# Patient Record
Sex: MALE | Race: WHITE | Employment: UNEMPLOYED | URBAN - METROPOLITAN AREA
[De-identification: names, ages, dates, MRNs, and addresses within clinical notes are randomized per-mention and may not be internally consistent; named-entity substitution may affect disease eponyms.]

---

## 2017-06-12 ENCOUNTER — TELEPHONE (OUTPATIENT)
Dept: FAMILY MEDICINE CLINIC | Age: 22
End: 2017-06-12

## 2017-06-13 ENCOUNTER — OFFICE VISIT (OUTPATIENT)
Dept: FAMILY MEDICINE CLINIC | Age: 22
End: 2017-06-13

## 2017-06-13 VITALS
BODY MASS INDEX: 32.91 KG/M2 | WEIGHT: 243 LBS | OXYGEN SATURATION: 95 % | TEMPERATURE: 98 F | SYSTOLIC BLOOD PRESSURE: 114 MMHG | HEIGHT: 72 IN | HEART RATE: 107 BPM | DIASTOLIC BLOOD PRESSURE: 74 MMHG

## 2017-06-13 DIAGNOSIS — J32.9 SINUSITIS, UNSPECIFIED CHRONICITY, UNSPECIFIED LOCATION: Primary | ICD-10-CM

## 2017-06-13 DIAGNOSIS — J06.9 ACUTE URI: ICD-10-CM

## 2017-06-13 PROCEDURE — 99203 OFFICE O/P NEW LOW 30 MIN: CPT | Performed by: FAMILY MEDICINE

## 2017-06-13 RX ORDER — FLUTICASONE PROPIONATE 50 MCG
SPRAY, SUSPENSION (ML) NASAL
Qty: 1 BOTTLE | Refills: 11 | Status: SHIPPED | OUTPATIENT
Start: 2017-06-13

## 2017-06-13 RX ORDER — AMOXICILLIN AND CLAVULANATE POTASSIUM 875; 125 MG/1; MG/1
1 TABLET, FILM COATED ORAL 2 TIMES DAILY
Qty: 20 TABLET | Refills: 0 | Status: SHIPPED | OUTPATIENT
Start: 2017-06-13 | End: 2017-06-23

## 2017-06-13 ASSESSMENT — PATIENT HEALTH QUESTIONNAIRE - PHQ9
SUM OF ALL RESPONSES TO PHQ9 QUESTIONS 1 & 2: 0
SUM OF ALL RESPONSES TO PHQ QUESTIONS 1-9: 0
1. LITTLE INTEREST OR PLEASURE IN DOING THINGS: 0
2. FEELING DOWN, DEPRESSED OR HOPELESS: 0

## 2017-06-15 ASSESSMENT — ENCOUNTER SYMPTOMS
SHORTNESS OF BREATH: 0
HOARSE VOICE: 0
SINUS PRESSURE: 1
SWOLLEN GLANDS: 1
COUGH: 1
SORE THROAT: 1

## 2017-07-10 ENCOUNTER — OFFICE VISIT (OUTPATIENT)
Dept: FAMILY MEDICINE CLINIC | Age: 22
End: 2017-07-10

## 2017-07-10 VITALS
DIASTOLIC BLOOD PRESSURE: 78 MMHG | OXYGEN SATURATION: 96 % | BODY MASS INDEX: 33.63 KG/M2 | WEIGHT: 248 LBS | HEART RATE: 96 BPM | SYSTOLIC BLOOD PRESSURE: 112 MMHG

## 2017-07-10 DIAGNOSIS — Z00.00 WELL ADULT EXAM: Primary | ICD-10-CM

## 2017-07-10 LAB — TSH REFLEX: 1.34 UIU/ML (ref 0.27–4.2)

## 2017-07-10 PROCEDURE — 36415 COLL VENOUS BLD VENIPUNCTURE: CPT | Performed by: FAMILY MEDICINE

## 2017-07-10 PROCEDURE — 99395 PREV VISIT EST AGE 18-39: CPT | Performed by: FAMILY MEDICINE

## 2017-07-10 ASSESSMENT — ENCOUNTER SYMPTOMS
CHEST TIGHTNESS: 0
ANAL BLEEDING: 0
EYE DISCHARGE: 0
CONSTIPATION: 0
ABDOMINAL PAIN: 0
DIARRHEA: 0
ABDOMINAL DISTENTION: 0
COUGH: 0
BLOOD IN STOOL: 0
SHORTNESS OF BREATH: 0

## 2019-10-18 ENCOUNTER — NURSE ONLY (OUTPATIENT)
Dept: FAMILY MEDICINE CLINIC | Age: 24
End: 2019-10-18
Payer: COMMERCIAL

## 2019-10-18 DIAGNOSIS — Z23 NEED FOR INFLUENZA VACCINATION: Primary | ICD-10-CM

## 2019-10-18 PROCEDURE — 90686 IIV4 VACC NO PRSV 0.5 ML IM: CPT | Performed by: NURSE PRACTITIONER

## 2019-10-18 PROCEDURE — 90471 IMMUNIZATION ADMIN: CPT | Performed by: NURSE PRACTITIONER

## 2021-01-10 ENCOUNTER — APPOINTMENT (OUTPATIENT)
Dept: CT IMAGING | Age: 26
End: 2021-01-10
Payer: COMMERCIAL

## 2021-01-10 ENCOUNTER — HOSPITAL ENCOUNTER (EMERGENCY)
Age: 26
Discharge: HOME | End: 2021-01-10
Payer: COMMERCIAL

## 2021-01-10 ENCOUNTER — NURSE TRIAGE (OUTPATIENT)
Dept: OTHER | Facility: CLINIC | Age: 26
End: 2021-01-10

## 2021-01-10 ENCOUNTER — APPOINTMENT (OUTPATIENT)
Dept: GENERAL RADIOLOGY | Age: 26
End: 2021-01-10
Payer: COMMERCIAL

## 2021-01-10 VITALS
HEART RATE: 86 BPM | OXYGEN SATURATION: 98 % | WEIGHT: 315 LBS | BODY MASS INDEX: 42.66 KG/M2 | SYSTOLIC BLOOD PRESSURE: 113 MMHG | DIASTOLIC BLOOD PRESSURE: 78 MMHG | RESPIRATION RATE: 19 BRPM | TEMPERATURE: 98.1 F | HEIGHT: 72 IN

## 2021-01-10 DIAGNOSIS — U07.1 COVID-19: Primary | ICD-10-CM

## 2021-01-10 DIAGNOSIS — R91.8 PULMONARY NODULES: ICD-10-CM

## 2021-01-10 LAB
A/G RATIO: 1.3 (ref 1.1–2.2)
ALBUMIN SERPL-MCNC: 4.3 G/DL (ref 3.4–5)
ALP BLD-CCNC: 128 U/L (ref 40–129)
ALT SERPL-CCNC: 50 U/L (ref 10–40)
ANION GAP SERPL CALCULATED.3IONS-SCNC: 12 MMOL/L (ref 3–16)
AST SERPL-CCNC: 37 U/L (ref 15–37)
BASOPHILS ABSOLUTE: 0.1 K/UL (ref 0–0.2)
BASOPHILS RELATIVE PERCENT: 0.9 %
BILIRUB SERPL-MCNC: 0.5 MG/DL (ref 0–1)
BUN BLDV-MCNC: 9 MG/DL (ref 7–20)
CALCIUM SERPL-MCNC: 9.1 MG/DL (ref 8.3–10.6)
CHLORIDE BLD-SCNC: 100 MMOL/L (ref 99–110)
CO2: 24 MMOL/L (ref 21–32)
CREAT SERPL-MCNC: 0.9 MG/DL (ref 0.9–1.3)
D DIMER: 362 NG/ML DDU (ref 0–229)
EKG ATRIAL RATE: 107 BPM
EKG DIAGNOSIS: NORMAL
EKG P AXIS: 18 DEGREES
EKG P-R INTERVAL: 122 MS
EKG Q-T INTERVAL: 332 MS
EKG QRS DURATION: 82 MS
EKG QTC CALCULATION (BAZETT): 443 MS
EKG R AXIS: 11 DEGREES
EKG T AXIS: 33 DEGREES
EKG VENTRICULAR RATE: 107 BPM
EOSINOPHILS ABSOLUTE: 0.2 K/UL (ref 0–0.6)
EOSINOPHILS RELATIVE PERCENT: 2.5 %
GFR AFRICAN AMERICAN: >60
GFR NON-AFRICAN AMERICAN: >60
GLOBULIN: 3.3 G/DL
GLUCOSE BLD-MCNC: 109 MG/DL (ref 70–99)
HCT VFR BLD CALC: 42 % (ref 40.5–52.5)
HEMOGLOBIN: 14.6 G/DL (ref 13.5–17.5)
LACTIC ACID: 1.4 MMOL/L (ref 0.4–2)
LYMPHOCYTES ABSOLUTE: 1.4 K/UL (ref 1–5.1)
LYMPHOCYTES RELATIVE PERCENT: 22.1 %
MAGNESIUM: 2 MG/DL (ref 1.8–2.4)
MCH RBC QN AUTO: 30.9 PG (ref 26–34)
MCHC RBC AUTO-ENTMCNC: 34.7 G/DL (ref 31–36)
MCV RBC AUTO: 89.1 FL (ref 80–100)
MONOCYTES ABSOLUTE: 0.7 K/UL (ref 0–1.3)
MONOCYTES RELATIVE PERCENT: 11.1 %
NEUTROPHILS ABSOLUTE: 4.1 K/UL (ref 1.7–7.7)
NEUTROPHILS RELATIVE PERCENT: 63.4 %
PDW BLD-RTO: 13.1 % (ref 12.4–15.4)
PLATELET # BLD: 265 K/UL (ref 135–450)
PMV BLD AUTO: 8.3 FL (ref 5–10.5)
POTASSIUM REFLEX MAGNESIUM: 3.5 MMOL/L (ref 3.5–5.1)
RBC # BLD: 4.71 M/UL (ref 4.2–5.9)
SODIUM BLD-SCNC: 136 MMOL/L (ref 136–145)
TOTAL PROTEIN: 7.6 G/DL (ref 6.4–8.2)
TROPONIN: <0.01 NG/ML
WBC # BLD: 6.5 K/UL (ref 4–11)

## 2021-01-10 PROCEDURE — 83605 ASSAY OF LACTIC ACID: CPT

## 2021-01-10 PROCEDURE — 99283 EMERGENCY DEPT VISIT LOW MDM: CPT

## 2021-01-10 PROCEDURE — 71260 CT THORAX DX C+: CPT

## 2021-01-10 PROCEDURE — 83735 ASSAY OF MAGNESIUM: CPT

## 2021-01-10 PROCEDURE — 84484 ASSAY OF TROPONIN QUANT: CPT

## 2021-01-10 PROCEDURE — 6360000004 HC RX CONTRAST MEDICATION: Performed by: PHYSICIAN ASSISTANT

## 2021-01-10 PROCEDURE — 93005 ELECTROCARDIOGRAM TRACING: CPT | Performed by: PHYSICIAN ASSISTANT

## 2021-01-10 PROCEDURE — 85025 COMPLETE CBC W/AUTO DIFF WBC: CPT

## 2021-01-10 PROCEDURE — 85379 FIBRIN DEGRADATION QUANT: CPT

## 2021-01-10 PROCEDURE — 71045 X-RAY EXAM CHEST 1 VIEW: CPT

## 2021-01-10 PROCEDURE — 80053 COMPREHEN METABOLIC PANEL: CPT

## 2021-01-10 PROCEDURE — 93010 ELECTROCARDIOGRAM REPORT: CPT | Performed by: INTERNAL MEDICINE

## 2021-01-10 PROCEDURE — 2580000003 HC RX 258: Performed by: PHYSICIAN ASSISTANT

## 2021-01-10 RX ORDER — 0.9 % SODIUM CHLORIDE 0.9 %
1000 INTRAVENOUS SOLUTION INTRAVENOUS ONCE
Status: COMPLETED | OUTPATIENT
Start: 2021-01-10 | End: 2021-01-10

## 2021-01-10 RX ORDER — ASPIRIN 81 MG/1
81 TABLET ORAL DAILY
Qty: 30 TABLET | Refills: 0 | Status: SHIPPED | OUTPATIENT
Start: 2021-01-10

## 2021-01-10 RX ADMIN — IOPAMIDOL 85 ML: 755 INJECTION, SOLUTION INTRAVENOUS at 12:55

## 2021-01-10 RX ADMIN — SODIUM CHLORIDE 1000 ML: 9 INJECTION, SOLUTION INTRAVENOUS at 12:17

## 2021-01-10 ASSESSMENT — ENCOUNTER SYMPTOMS
COUGH: 1
VOMITING: 0
EYE PAIN: 0
SORE THROAT: 1
ABDOMINAL PAIN: 0
SHORTNESS OF BREATH: 1
BACK PAIN: 0
NAUSEA: 0

## 2021-01-10 NOTE — TELEPHONE ENCOUNTER
Reason for Disposition   MILD difficulty breathing (e.g., minimal/no SOB at rest, SOB with walking, pulse <100)    Answer Assessment - Initial Assessment Questions  1. COVID-19 DIAGNOSIS: \"Who made your Coronavirus (COVID-19) diagnosis? \" \"Was it confirmed by a positive lab test?\" If not diagnosed by a HCP, ask \"Are there lots of cases (community spread) where you live? \" (See public health department website, if unsure)      [facility]  2. COVID-19 EXPOSURE: \"Was there any known exposure to COVID before the symptoms began? \" CDC Definition of close contact: within 6 feet (2 meters) for a total of 15 minutes or more over a 24-hour period. unsure  3. ONSET: \"When did the COVID-19 symptoms start?\"       1/2/2020  4. WORST SYMPTOM: \"What is your worst symptom? \" (e.g., cough, fever, shortness of breath, muscle aches)      SOB- like I can not get a deep breath  5. COUGH: \"Do you have a cough? \" If so, ask: \"How bad is the cough? \"        Yes - moderate  6. FEVER: \"Do you have a fever? \" If so, ask: \"What is your temperature, how was it measured, and when did it start? \"      no  7. RESPIRATORY STATUS: \"Describe your breathing? \" (e.g., shortness of breath, wheezing, unable to speak)       SOB - Wheezing and feel like can't take a deep breath. 8. BETTER-SAME-WORSE: \"Are you getting better, staying the same or getting worse compared to yesterday? \"  If getting worse, ask, \"In what way? \"      Worse- SOB  9. HIGH RISK DISEASE: \"Do you have any chronic medical problems? \" (e.g., asthma, heart or lung disease, weak immune system, obesity, etc.)      Asthma  10. PREGNANCY: \"Is there any chance you are pregnant? \" \"When was your last menstrual period? \"        n/a  11. OTHER SYMPTOMS: \"Do you have any other symptoms? \"  (e.g., chills, fatigue, headache, loss of smell or taste, muscle pain, sore throat; new loss of smell or taste especially support the diagnosis of COVID-19)        Nasal congestion, body aches, dry

## 2021-01-10 NOTE — ED TRIAGE NOTES
Chief Complaint   Patient presents with    Shortness of Breath     c/o sob x 2 days, diagnosed with covid on Wednesday, sore throat

## 2021-01-11 ENCOUNTER — CARE COORDINATION (OUTPATIENT)
Dept: CARE COORDINATION | Age: 26
End: 2021-01-11

## 2021-01-11 NOTE — CARE COORDINATION
First attempt to reach the pt by the RN, JENNIFER. The RN, Ambulatory Care Manager called and left a message with the nurse's call back number asking the pt to return the nurse's call. As a resource only, due to the recent COVID-19 pandemic, [facility]) has a [employer], [phone] for anyone that is experiencing respiratory problems, fever or Flu-like symptoms. Pt is aware that he is positive for COVID-19.

## 2021-01-11 NOTE — ED PROVIDER NOTES
[address] ED  EMERGENCY DEPARTMENT ENCOUNTER        Pt Name: Shaye Herrera  MRN: [id number]  [place] 1995  Date of evaluation: 1/10/2021  Provider: [facility] [place]  PCP: Bryanna Angel MD    DASHA. I have evaluated this patient. My supervising physician was available for consultation. CHIEF COMPLAINT       Chief Complaint   Patient presents with    Shortness of Breath     c/o sob x 2 days, diagnosed with covid on Wednesday, sore throat       HISTORY OF PRESENT ILLNESS   (Location, Timing/Onset, Context/Setting, Quality, Duration, Modifying Factors, Severity, Associated Signs and Symptoms)  Note limiting factors. Shaye Herrera is a 23 y.o. male with recent diagnosis of COVID-19 presents emergency room for shortness of breath, sore throat. Patient reports that he was diagnosed with COVID-19 on Wednesday. Has had symptoms for approximately 1 week. Notes that over the last 2 days he has had a sore throat, shortness of breath. Feels \"icky\". Denies tongue lip or throat swelling, inability swallow solids or liquids, excessive drooling or spitting, chest pain, hemoptysis, calf swelling or pain, abdominal pain, nausea, vomiting, numbness, weakness. Nursing Notes were all reviewed and agreed with or any disagreements were addressed in the HPI. REVIEW OF SYSTEMS    (2-9 systems for level 4, 10 or more for level 5)     Review of Systems   Constitutional: Negative for fever. HENT: Positive for sore throat. Eyes: Negative for pain and visual disturbance. Respiratory: Positive for cough and shortness of breath. Cardiovascular: Negative for chest pain. Gastrointestinal: Negative for abdominal pain, nausea and vomiting. Genitourinary: Negative for dysuria and frequency. Musculoskeletal: Negative for back pain and neck pain. Skin: Negative for rash. Neurological: Negative for weakness, numbness and headaches.    Psychiatric/Behavioral: Negative for confusion. Positives and Pertinent negatives as per HPI. Except as noted above in the ROS, all other systems were reviewed and negative. PAST MEDICAL HISTORY     Past Medical History:   Diagnosis Date    ADHD (attention deficit hyperactivity disorder)     Asperger's syndrome          SURGICAL HISTORY     Past Surgical History:   Procedure Laterality Date    TESTICLE REMOVAL           CURRENTMEDICATIONS       Discharge Medication List as of 1/10/2021  2:00 PM      CONTINUE these medications which have NOT CHANGED    Details   fluticasone (FLONASE) 50 MCG/ACT nasal spray 2 SPRAYS UP EACH NOSTRIL daily, Disp-1 Bottle, R-11Normal               ALLERGIES     Triamazide [hydrochlorothiazide w-triamterene]    FAMILYHISTORY     History reviewed. No pertinent family history. SOCIAL HISTORY       Social History     Tobacco Use    Smoking status: Never Smoker    Smokeless tobacco: Never Used   Substance Use Topics    Alcohol use: Yes     Frequency: Never     Comment: occ    Drug use: Never       SCREENINGS             PHYSICAL EXAM    (up to 7 for level 4, 8 or more for level 5)     ED Triage Vitals [01/10/21 1157]   BP Temp Temp Source Pulse Resp SpO2 Height Weight   138/79 98.1 °F (36.7 °C) Oral 118 18 97 % 6' (1.829 m) (!) 320 lb (145.2 kg)       Physical Exam  Vitals signs reviewed. Constitutional:       Appearance: He is not diaphoretic. HENT:      Nose: No congestion or rhinorrhea. Mouth/Throat:      Mouth: Mucous membranes are moist.      Pharynx: Oropharynx is clear. No oropharyngeal exudate or posterior oropharyngeal erythema. Eyes:      General: No scleral icterus. Conjunctiva/sclera: Conjunctivae normal.   Neck:      Musculoskeletal: Normal range of motion and neck supple. Cardiovascular:      Rate and Rhythm: Regular rhythm. Tachycardia present. Pulses: Normal pulses. Heart sounds: Normal heart sounds. No murmur. No friction rub. No gallop.        Comments: Triage tachycardia noted. Pulmonary:      Effort: Pulmonary effort is normal. No respiratory distress. Breath sounds: Normal breath sounds. No stridor. No wheezing, rhonchi or rales. Abdominal:      General: There is no distension. Palpations: Abdomen is soft. Tenderness: There is no abdominal tenderness. There is no right CVA tenderness, left CVA tenderness, guarding or rebound. Musculoskeletal: Normal range of motion. General: No swelling or tenderness. Skin:     General: Skin is warm and dry. Capillary Refill: Capillary refill takes less than 2 seconds. Neurological:      General: No focal deficit present. Mental Status: He is alert and oriented to person, place, and time. Sensory: No sensory deficit. Motor: No weakness.       Gait: Gait normal.   Psychiatric:         Mood and Affect: Mood normal.         Behavior: Behavior normal.         DIAGNOSTIC RESULTS   LABS:    Labs Reviewed   COMPREHENSIVE METABOLIC PANEL W/ REFLEX TO MG FOR LOW K - Abnormal; Notable for the following components:       Result Value    Glucose 109 (*)     ALT 50 (*)     All other components within normal limits    Narrative:     Performed at:  [place] [address],  [employer], [facility]   Phone [phone]   D-DIMER, QUANTITATIVE - Abnormal; Notable for the following components:    D-Dimer, Quant 362 (*)     All other components within normal limits    Narrative:     Performed at:  [place] [address],  [place], [facility]   Phone [phone]   CBC WITH AUTO DIFFERENTIAL    Narrative:     Performed at:  [place] [address],  [employer], [facility]   Phone [phone]   TROPONIN    Narrative:     Performed at:  [place] [address],  [place], [facility]   Phone [phone]   LACTIC ACID, PLASMA    Narrative: Performed at:  [facility] [address],  [place], [facility]   Phone [phone]   MAGNESIUM    Narrative:     Performed at:  [facility],  [place], [facility]   Phone [phone]       All other labs were within normal range or not returned as of this dictation. EKG: All EKG's are interpreted by the Emergency Department Physician in the absence of a cardiologist.  Please see their note for interpretation of EKG. RADIOLOGY:   Non-plain film images such as CT, Ultrasound and MRI are read by the radiologist. Plain radiographic images are visualized and preliminarily interpreted by the ED Provider with the below findings:        Interpretation per the Radiologist below, if available at the time of this note:    CT CHEST PULMONARY EMBOLISM W CONTRAST   Final Result   *No CT evidence of acute pulmonary embolism. *Multiple pulmonary nodules are seen within both lungs, largest measuring 1.9   x 1.0 cm within the lingula. Please see follow-up recommendations below. RECOMMENDATIONS:   Fleischner Society guidelines for follow-up and management of incidentally   detected pulmonary nodules:      Multiple Solid Nodules:      Nodule size greater than 8 mm   In a low-risk patient, CT at 3-6 months, then consider CT at 18-24 months. In a high-risk patient, CT at 3-6 months, then CT at 18-24 months. - Low risk patients include individuals with minimal or absent history of   smoking and other known risk factors. - High risk patients include individuals with a history or smoking or known   risk factors. Radiology 2017 http://pubs. rsna.org/doi/full/10.1148/radiol. 8133461594         XR CHEST PORTABLE   Final Result   No acute airspace disease. Enlargement of the cardiac silhouette as compared to the 2013 exam which can   reflect cardiac enlargement or potentially pericardial effusion.            Xr Chest Portable    Result Date: 1/10/2021  EXAMINATION: ONE XRAY VIEW OF THE CHEST 1/10/2021 12:23 pm COMPARISON: 06/15/2013 HISTORY: ORDERING SYSTEM PROVIDED HISTORY: SOB TECHNOLOGIST PROVIDED HISTORY: Reason for exam:->SOB Reason for Exam: shortness of breath Acuity: Acute Type of Exam: Initial FINDINGS: Cardiac leads project over the chest.  Attenuation by soft tissue bilaterally. No confluent airspace disease. No pleural effusion or pneumothorax. Cardiac silhouette is upper limits of normal in size, larger than prior. No acute airspace disease. Enlargement of the cardiac silhouette as compared to the 2013 exam which can reflect cardiac enlargement or potentially pericardial effusion. Ct Chest Pulmonary Embolism W Contrast    Result Date: 1/10/2021  EXAMINATION: CTA OF THE CHEST 1/10/2021 12:44 pm TECHNIQUE: CTA of the chest was performed after the administration of intravenous contrast.  Multiplanar reformatted images are provided for review. MIP images are provided for review. Dose modulation, iterative reconstruction, and/or weight based adjustment of the mA/kV was utilized to reduce the radiation dose to as low as reasonably achievable. COMPARISON: Chest performed earlier today. HISTORY: ORDERING SYSTEM PROVIDED HISTORY: r/o Pulmonary Embolism TECHNOLOGIST PROVIDED HISTORY: Reason for exam:->r/o Pulmonary Embolism Reason for Exam: SOB, CHEST PAIN, COUGHING, COVID + Acuity: Acute Type of Exam: Initial FINDINGS: Pulmonary Arteries: Pulmonary arteries are adequately opacified for evaluation. No evidence of intraluminal filling defect to suggest pulmonary embolism. Main pulmonary artery is normal in caliber. Mediastinum: No evidence of mediastinal lymphadenopathy. The heart and pericardium demonstrate no acute abnormality. There is no acute abnormality of the thoracic aorta. Lungs/pleura: No focal consolidation, pneumothorax or pleural effusion.  Noncalcified pulmonary nodule is seen involving the right upper lobe series 3, image 48 measuring 1 cm in greatest axial dimension. 3 mm solid noncalcified pulmonary nodule right lower lobe series 3, image 73. Additional 3 mm solid calcified pulmonary nodule seen involving the right upper lobe series 3, image 35. Additional nodular areas seen involving the lingula on series 3, image 58 measuring 1.9 x 1.0 cm. Upper Abdomen: No acute findings. Soft Tissues/Bones: Visualized soft tissues surrounding the chest wall demonstrate no acute findings. *No CT evidence of acute pulmonary embolism. *Multiple pulmonary nodules are seen within both lungs, largest measuring 1.9 x 1.0 cm within the lingula. Please see follow-up recommendations below. RECOMMENDATIONS: Fleischner Society guidelines for follow-up and management of incidentally detected pulmonary nodules: Multiple Solid Nodules: Nodule size greater than 8 mm In a low-risk patient, CT at 3-6 months, then consider CT at 18-24 months. In a high-risk patient, CT at 3-6 months, then CT at 18-24 months. - Low risk patients include individuals with minimal or absent history of smoking and other known risk factors. - High risk patients include individuals with a history or smoking or known risk factors. Radiology 2017 http://pubs. rsna.org/doi/full/10.1148/radiol. 3085034891           PROCEDURES   Unless otherwise noted below, none     Procedures    CRITICAL CARE TIME   N/A    CONSULTS:  None      EMERGENCY DEPARTMENT COURSE and DIFFERENTIAL DIAGNOSIS/MDM:   Vitals:    Vitals:    01/10/21 1307 01/10/21 1337 01/10/21 1354 01/10/21 1405   BP: 113/75 118/71  113/78   Pulse: 94 85 88 86   Resp: 20 11 19   Temp:       TempSrc:       SpO2: 99% 100%  98%   Weight:       Height:           Patient was given the following medications:  Medications   0.9 % sodium chloride bolus (0 mLs Intravenous Stopped 1/10/21 1401)   iopamidol (ISOVUE-370) 76 % injection 85 mL (85 mLs Intravenous Given 1/10/21 1255)           72-year-old male with PA  01/10/21 1928

## 2021-01-12 ENCOUNTER — TELEPHONE (OUTPATIENT)
Dept: CASE MANAGEMENT | Age: 26
End: 2021-01-12

## 2021-01-12 ENCOUNTER — CARE COORDINATION (OUTPATIENT)
Dept: CARE COORDINATION | Age: 26
End: 2021-01-12

## 2021-01-12 ENCOUNTER — TELEPHONE (OUTPATIENT)
Dept: FAMILY MEDICINE CLINIC | Age: 26
End: 2021-01-12

## 2021-01-12 NOTE — TELEPHONE ENCOUNTER
Fyi: Per TL - approved to see as a new patient. Patient was last seen in 2017 by Jeanine Torre. Father was a pt of Quang Lucas and approval was also given to see father, Tyler Layne.

## 2021-01-12 NOTE — CARE COORDINATION
Patient contacted regarding IKPLV-25 diagnosis\". Discussed COVID-19 related testing which was available at this time. Test results were positive. Patient informed of results, if available? Yes    New Medication:  aspirin EC 81 MG EC tablet 30 tablet 0 1/10/2021     Sig - Route: Take 1 tablet by mouth daily - Oral        Care Transition Nurse/ Ambulatory Care Manager contacted the patient by telephone to perform post discharge assessment. Call within 2 business days of discharge: Yes. Verified name and  with patient as identifiers. Provided introduction to self, and explanation of the CTN/ACM role, and reason for call due to risk factors for infection and/or exposure to COVID-19. Symptoms reviewed with patient who verbalized the following symptoms: fatigue, pain or aching joints, cough, chills or shaking, sweating, nausea, chest pain, fast heart rate, cold, clammy and pale skin, no new symptoms, no worsening symptoms and sore throat. Due to no new or worsening symptoms encounter was not routed to provider for escalation. Discussed follow-up appointments. If no appointment was previously scheduled, appointment scheduling offered: Yes, pt will follow with PCP  [facility] follow up appointment(s): No future appointments. Non-face-to-face services provided:    Obtained and reviewed discharge summary and/or continuity of care documents     Advance Care Planning:   Does patient have an Advance Directive:  decision maker updated. Patient has following risk factors of: asthma and Pulmonary Nodules. CTN/ACM reviewed discharge instructions, medical action plan and red flags such as increased shortness of breath, increasing fever and signs of decompensation with patient who verbalized understanding. Discussed exposure protocols and quarantine with CDC Guidelines What to do if you are sick with coronavirus disease .  Patient was given an opportunity for questions and concerns.  The patient agrees to contact the [facility] exposure line [phone], local [facility] department [place] Department of Health: ([phone]) and PCP office for questions related to their healthcare. CTN/ACM provided contact information for future needs. Reviewed and educated patient on any new and changed medications related to discharge diagnosis. Pulse oximeter reviewed and pt's latest SpO2 was 96%. Patient/family/caregiver given information for [employer] and agrees to enroll yes  Patient's preferred e-mail: [email or web address]   Patient's preferred phone number: [phone]  Based on Loop alert triggers, patient will be contacted by nurse care manager for worsening symptoms. Pt will be further monitored by COVID Loop Team based on severity of symptoms and risk factors.

## 2021-01-19 ENCOUNTER — CARE COORDINATION (OUTPATIENT)
Dept: OTHER | Facility: CLINIC | Age: 26
End: 2021-01-19

## 2021-01-19 NOTE — CARE COORDINATION
[address] [facility] ED Follow Up Call    2021    Patient: Ian Mcfadden Patient : 1995   MRN: [id number]  Reason for Admission: COVID, pulmonary nodules  Discharge Date: 1/10/2021      AC attempted to reach patient for follow up call. [facility] was not able to leave HIPAA compliant message left requesting a return phone call at patients convenience. Mailbox is full. Will continue to follow.

## 2021-01-21 ENCOUNTER — TELEPHONE (OUTPATIENT)
Dept: CASE MANAGEMENT | Age: 26
End: 2021-01-21

## 2021-01-22 ENCOUNTER — CARE COORDINATION (OUTPATIENT)
Dept: OTHER | Facility: CLINIC | Age: 26
End: 2021-01-22

## 2021-01-22 NOTE — CARE COORDINATION
[facility] Transitions Follow Up Call    2021    Patient: Cameron Andre  Patient : 1995   MRN: [id number]  Reason for Admission: COVID, pulmonary nodules  Discharge Date: 1/10/21 RARS: No data recorded       ACM attempted to reach patient for follow up call. HIPAA compliant message left requesting a return phone call at patients convenience. No further outreach scheduled with this ACM, ACM will sign off care team at this time. Episode of Care resolved. Patient has this ACM's contact information if future needs arise.        Follow Up  Future Appointments   Date Time Provider Ashutosh Siegel   2021  1:40 PM LESLY Flores [facility] SUELLEN Pina RN

## 2021-02-01 ENCOUNTER — TELEPHONE (OUTPATIENT)
Dept: PULMONOLOGY | Age: 26
End: 2021-02-01

## 2021-02-01 ENCOUNTER — OFFICE VISIT (OUTPATIENT)
Dept: PRIMARY CARE CLINIC | Age: 26
End: 2021-02-01
Payer: COMMERCIAL

## 2021-02-01 VITALS
DIASTOLIC BLOOD PRESSURE: 70 MMHG | HEIGHT: 72 IN | SYSTOLIC BLOOD PRESSURE: 130 MMHG | TEMPERATURE: 97.5 F | BODY MASS INDEX: 42.66 KG/M2 | HEART RATE: 99 BPM | OXYGEN SATURATION: 96 % | WEIGHT: 315 LBS

## 2021-02-01 DIAGNOSIS — R03.0 ELEVATED BLOOD PRESSURE READING WITHOUT DIAGNOSIS OF HYPERTENSION: ICD-10-CM

## 2021-02-01 DIAGNOSIS — R93.89 ABNORMAL CHEST X-RAY: ICD-10-CM

## 2021-02-01 DIAGNOSIS — R91.8 PULMONARY NODULES: ICD-10-CM

## 2021-02-01 DIAGNOSIS — E66.01 CLASS 3 SEVERE OBESITY DUE TO EXCESS CALORIES WITH BODY MASS INDEX (BMI) OF 40.0 TO 44.9 IN ADULT, UNSPECIFIED WHETHER SERIOUS COMORBIDITY PRESENT (HCC): ICD-10-CM

## 2021-02-01 DIAGNOSIS — Z86.16 HISTORY OF COVID-19: ICD-10-CM

## 2021-02-01 DIAGNOSIS — F32.A DEPRESSION, UNSPECIFIED DEPRESSION TYPE: ICD-10-CM

## 2021-02-01 DIAGNOSIS — I51.7 CARDIOMEGALY: ICD-10-CM

## 2021-02-01 DIAGNOSIS — Z76.89 ENCOUNTER TO ESTABLISH CARE: Primary | ICD-10-CM

## 2021-02-01 LAB
BASOPHILS ABSOLUTE: 0 K/UL (ref 0–0.2)
BASOPHILS RELATIVE PERCENT: 0.6 %
EOSINOPHILS ABSOLUTE: 0.3 K/UL (ref 0–0.6)
EOSINOPHILS RELATIVE PERCENT: 3.8 %
HCT VFR BLD CALC: 44.5 % (ref 40.5–52.5)
HEMOGLOBIN: 15.2 G/DL (ref 13.5–17.5)
LYMPHOCYTES ABSOLUTE: 1.6 K/UL (ref 1–5.1)
LYMPHOCYTES RELATIVE PERCENT: 21.2 %
MCH RBC QN AUTO: 30.3 PG (ref 26–34)
MCHC RBC AUTO-ENTMCNC: 34.2 G/DL (ref 31–36)
MCV RBC AUTO: 88.4 FL (ref 80–100)
MONOCYTES ABSOLUTE: 0.6 K/UL (ref 0–1.3)
MONOCYTES RELATIVE PERCENT: 7.6 %
NEUTROPHILS ABSOLUTE: 5.1 K/UL (ref 1.7–7.7)
NEUTROPHILS RELATIVE PERCENT: 66.8 %
PDW BLD-RTO: 13.2 % (ref 12.4–15.4)
PLATELET # BLD: 274 K/UL (ref 135–450)
PMV BLD AUTO: 9.6 FL (ref 5–10.5)
RBC # BLD: 5.03 M/UL (ref 4.2–5.9)
WBC # BLD: 7.6 K/UL (ref 4–11)

## 2021-02-01 PROCEDURE — 99204 OFFICE O/P NEW MOD 45 MIN: CPT | Performed by: PHYSICIAN ASSISTANT

## 2021-02-01 ASSESSMENT — ENCOUNTER SYMPTOMS
SINUS PAIN: 0
ABDOMINAL PAIN: 0
DIARRHEA: 0
CONSTIPATION: 0
RHINORRHEA: 0
VOMITING: 0
SHORTNESS OF BREATH: 0
NAUSEA: 0
SORE THROAT: 0
CHEST TIGHTNESS: 0
SINUS PRESSURE: 0
COUGH: 0
EYE DISCHARGE: 0
EYE REDNESS: 0

## 2021-02-01 ASSESSMENT — PATIENT HEALTH QUESTIONNAIRE - PHQ9
SUM OF ALL RESPONSES TO PHQ QUESTIONS 1-9: 15
8. MOVING OR SPEAKING SO SLOWLY THAT OTHER PEOPLE COULD HAVE NOTICED. OR THE OPPOSITE, BEING SO FIGETY OR RESTLESS THAT YOU HAVE BEEN MOVING AROUND A LOT MORE THAN USUAL: 1
4. FEELING TIRED OR HAVING LITTLE ENERGY: 2
6. FEELING BAD ABOUT YOURSELF - OR THAT YOU ARE A FAILURE OR HAVE LET YOURSELF OR YOUR FAMILY DOWN: 1
SUM OF ALL RESPONSES TO PHQ QUESTIONS 1-9: 15
SUM OF ALL RESPONSES TO PHQ QUESTIONS 1-9: 15
5. POOR APPETITE OR OVEREATING: 2
7. TROUBLE CONCENTRATING ON THINGS, SUCH AS READING THE NEWSPAPER OR WATCHING TELEVISION: 3

## 2021-02-01 ASSESSMENT — COLUMBIA-SUICIDE SEVERITY RATING SCALE - C-SSRS
2. HAVE YOU ACTUALLY HAD ANY THOUGHTS OF KILLING YOURSELF?: YES
1. WITHIN THE PAST MONTH, HAVE YOU WISHED YOU WERE DEAD OR WISHED YOU COULD GO TO SLEEP AND NOT WAKE UP?: NO
4. HAVE YOU HAD THESE THOUGHTS AND HAD SOME INTENTION OF ACTING ON THEM?: NO
3. HAVE YOU BEEN THINKING ABOUT HOW YOU MIGHT KILL YOURSELF?: NO

## 2021-02-01 NOTE — PROGRESS NOTES
2021    Elma Arrieta (:  1995) is a 23 y.o. male, here for evaluation of the following medical concerns:     Chief Complaint   Patient presents with   [address] [place] [address] Patient        HPI patient presents to establish PCP and follow-up on Covid. Patient states that he got symptoms of Covid in early January had pretty significant coughing. He advised most his symptoms have resolved at this point. He started taking a baby aspirin every day when diagnosed with Covid. He is continue taking. His father advised he is very sedentary and he would like him to continue taking. Patient advised that he could be healthier. Patient is not currently working. 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 He has not been able to find anything since due to the COVID epidemic. Patient has friends. Patient has supportive family. Lives with father. Has things that bring him mendy. Plays video games and collects cards. Likes to watch anime. Patient advised he is not that physically active. Patient advised that he has been to a therapist in the past when he was a child learning to deal with his anger and work through a lot of problems that he had with socialization while growing up. He feels that he is doing okay at this time he does not feel that he needs to go to a therapist at this time. Mother has a history of \"enlarged heart\"  Father insignificant medical history. Patient advised he does not smoke or vape. Patient has occasional social drinking. Denies any illicit drug use. Review of Systems   Constitutional: Negative for chills and fever. HENT: Negative. Negative for congestion, rhinorrhea, sinus pressure, sinus pain and sore throat. Eyes: Negative for discharge, redness and visual disturbance. Respiratory: Negative for cough, chest tightness and shortness of breath. Cardiovascular: Negative for chest pain and palpitations. Gastrointestinal: Negative for abdominal pain, constipation, diarrhea, nausea and vomiting. Genitourinary: Negative for difficulty urinating, dysuria and frequency. Musculoskeletal: Negative. Skin: Negative. Neurological: Negative. Negative for dizziness, weakness, numbness and headaches. Psychiatric/Behavioral: Negative. All other systems reviewed and are negative. Prior to Visit Medications    Medication Sig Taking?  Authorizing Provider   aspirin EC 81 MG EC tablet Take 1 tablet by mouth daily Yes Corinne Fruit, PA   fluticasone (FLONASE) 50 MCG/ACT nasal spray 2 SPRAYS UP EACH NOSTRIL daily  Patient taking differently: as needed for Allergies 2 SPRAYS UP EACH NOSTRIL daily Yes Johana Hong MD        Allergies   Allergen Reactions    Triamazide [Hydrochlorothiazide W-Triamterene]        Past Medical History:   Diagnosis Date    ADHD (attention deficit hyperactivity disorder)     Asperger's syndrome        Past Surgical History:   Procedure Laterality Date    TESTICLE REMOVAL         Social History     Socioeconomic History    Marital status: Single     Spouse name: Not on file    Number of children: Not on file    Years of education: Not on file    Highest education level: Not on file   Occupational History    Not on file   Social Needs    Financial resource strain: Not on file    Food insecurity     Worry: Not on file     Inability: Not on file    Transportation needs     Medical: Not on file     Non-medical: Not on file   Tobacco Use    Smoking status: Never Smoker    Smokeless tobacco: Never Used   Substance and Sexual Activity    Alcohol use: Yes     Frequency: Never     Comment: occ    Drug use: Never    Sexual activity: Not on file   Lifestyle    Physical activity     Days per week: Not on file     Minutes per session: Not on file    Stress: Not on file   Relationships    Social connections     Talks on phone: Not on file     Gets together: Not on file Effort: Pulmonary effort is normal. No respiratory distress. Breath sounds: Normal breath sounds. No wheezing. Abdominal:      General: Bowel sounds are normal. There is no distension. Palpations: Abdomen is soft. Tenderness: There is no abdominal tenderness. Musculoskeletal: Normal range of motion. Skin:     General: Skin is warm and dry. Capillary Refill: Capillary refill takes less than 2 seconds. Coloration: Skin is pale. Neurological:      General: No focal deficit present. Mental Status: He is alert and oriented to person, place, and time. Psychiatric:         Attention and Perception: Attention and perception normal.         Mood and Affect: Mood normal.         Speech: Speech normal.         Behavior: Behavior normal. Behavior is cooperative. Thought Content: Thought content normal.         Cognition and Memory: Cognition and memory normal. Impaired: ion. Judgment: Judgment normal.      Comments: Poor eye contact,   Patient denies any active plan for self-harm or harming others. He struggles at times with depressed mood. He had thoughts for self-harm when he was in college which is removed. He has no active plan or intent. He denies any delusions or hallucinations. ASSESSMENT/PLAN:   Nontoxic patient in no acute distress. Patient appears to be recovering well from Covid infection. Work-up from ER revealed pulmonary nodules which were numerous and concerning radiologist center no recommending pulmonologist referral which I provided to the patient I did emphasize the importance of this to the patient and his father who verbalized understanding. On chest x-ray also revealed some cardiomegaly discussed this with the patient and his father who indicated that biological mother had a history of enlarged heart. As patient has mild hypertension today, read on cxr, in the ER and significantly overweight for his age sedentary lifestyle I think that getting him established with cards and getting the recommendation about echo/ +/- tx beta-blocker, further recommendations will be helpful. Provide this recommendation to the patient. Of note patient did have an episode of tachycardia as a child and was seen at [facility] cardiology team.     Patient appears to be struggling with some depression social anxiety. Positive PHQ. Patient has no plan for active self-harm. He has reliable he has an open relationship with his father where he feels comfortable discussing his feelings. He has good supportive relationships in family and friends. Spinal large amount of time in our exam discussing this. He said that he does not feel that he needs further therapy at this time he is not taking any medication for this at this time. Patient is aware that he can contact our office or acute treatment if this changes, at any point. *Venipuncture performed in office to obtain labs*    1. Encounter to establish care  - CBC WITH AUTO DIFFERENTIAL; Future  - COMPREHENSIVE METABOLIC PANEL; Future  - TSH with Reflex; Future  - Vitamin D 25 Hydroxy  - Vitamin B12 & Folate  - Lipid Panel    2. Pulmonary nodules  - Caroline Greco MD, Pulmonary, [facility]    3. Class 3 severe obesity due to excess calories with body mass index (BMI) of 40.0 to 44.9 in adult, unspecified whether serious comorbidity present ([facility]  * Consider sleep study, sleep apnea, discussion at future visit. - CBC WITH AUTO DIFFERENTIAL; Future  - COMPREHENSIVE METABOLIC PANEL; Future  - TSH with Reflex; Future  - Vitamin D 25 Hydroxy  - Vitamin B12 & Folate  - Lipid Panel    4.  Cardiomegaly - Jyoti Benoit MD, Cardiology, [facility]    5. Abnormal chest x-ray  - [facility] - Padmini Pickens MD, Cardiology, [facility] Ulysses Ace MD, Pulmonary, [facility]    6. History of COVID-19  Advised against continuing baby ASA. - Allegra Cheung MD, Pulmonary, [facility]    7. Elevated blood pressure reading without diagnosis of hypertension  -Will follow and re-check    8. Depression  -will provide resources, follow and re-check    Return in about 3 months (around 5/1/2021), or if symptoms worsen or fail to improve. An  electronic signature was used to authenticate this note.          --Cece Umanzor PA-C on 2/1/2021 at 3:56 PM

## 2021-02-01 NOTE — PATIENT INSTRUCTIONS
Behavioral Health Treatment Services   Contact [facility] : [email or web address]    Telephone Hotlines  All hotline numbers are toll-free. [address]  5-525-141-HELP (9689)  TTY: 6-232.540.7024  Suicide Prevention Lifeline  6-208-125-TALK (2043)  TTY: 7-176.373.1801  Disaster Distress Helpline  4-399.538.3752  TTY: 9-384.512.9257  Text TalkWithUs to 62517    Learning About Left Ventricular Hypertrophy (LVH)  What is left ventricular hypertrophy? Left ventricular hypertrophy (LVH) means that the muscle of the heart's main pump (left ventricle) has become thick and enlarged. This can happen over time if the left ventricle has to work too hard. This part of the heart needs to be strong to pump oxygen-rich blood to your entire body. When the ventricle gets thick, other changes can happen in the heart. The heart's electrical system might not work normally, the heart muscle may not get enough oxygen, and the heart may not pump as well as it should. LVH is usually caused by high blood pressure. It may also be caused by a heart problem, such as hypertrophic cardiomyopathy or a heart valve problem like aortic valve stenosis. It can be stressful to learn that you have a problem with your heart. But there are things you can do to feel better and help keep this condition from getting worse. What are the symptoms? LVH may not cause symptoms. When it does, the most common ones are:  · Shortness of breath. · Feeling tired or dizzy. · Angina symptoms, such as chest pain or pressure, which may be worse when you're active. · Feeling like your heart is fluttering, racing, or pounding (palpitations). New or worse symptoms may be a sign of heart failure. Heart failure means that your heart doesn't pump as much blood as your body needs. What can you expect when you have LVH? LVH is linked to an increased risk of other problems, including heart attack, heart failure, stroke, and heart rhythm problems. Treatment can help reduce these risks. How is LVH treated? The best treatment will depend on what caused LVH. For many people, the focus will be on treating high blood pressure. Getting high blood pressure under control may keep LVH from getting worse. This can help prevent heart failure. It can also help lower the risk of heart attack and stroke. Medicines and lifestyle changes are used to treat high blood pressure. It may take some time to find the right medicine or medicines for you. Work with your doctor by taking your medicines as prescribed and going to all of your follow-up appointments. If LVH was caused by a heart problem, you may have other treatment options. Treatment may help lower your risk of heart failure and other serious problems. What you can do at home  Healthy habits are important for your heart. Taking an active role in your treatment can help you feel better and protect your health. · Be more active. Talk to your doctor before you start an exercise program. Together you can create a plan that can help keep your heart and body healthy. Your doctor might suggest that you get 30 minutes of exercise most days of the week. · Eat heart-healthy foods. Heart-healthy foods include fruits, vegetables, high-fiber foods, fish, and foods low in sodium, saturated fat, and trans fat. · Lose extra weight. Being active and eating healthy foods can help you stay at a healthy weight or lose weight if you need to. · Take your medicines exactly as prescribed. Do not stop or change your medicines without talking to your doctor first. Talk to your doctor if you have problems with your medicines. · Don't smoke. Quitting smoking lowers your risk of heart attack and stroke. If you need help quitting, talk to your doctor about stop-smoking programs and medicines. These can increase your chances of quitting for good. · Manage other health problems. These include diabetes and high cholesterol. If you think you may have a problem with alcohol or drug use, talk to your doctor. Follow-up care is a key part of your treatment and safety. Be sure to make and go to all appointments, and call your doctor if you are having problems. It's also a good idea to know your test results and keep a list of the medicines you take. Where can you learn more? Go to https://Focal EnergypeTidyClubeb.Weblio. org and sign in to your [email or web address].la account. Enter E964 in the [employer] box to learn more about \"Learning About Left Ventricular Hypertrophy (LVH). \"     If you do not have an account, please click on the \"Sign Up Now\" link. Current as of: December 16, 2019               Content Version: 12.6  © 4848-2121 [employer]. Care instructions adapted under license by [facility] ([facility]). If you have questions about a medical condition or this instruction, always ask your healthcare professional. [place] [address] any warranty or liability for your use of this information. Learning About Lung Nodules  What is a lung nodule? A lung nodule is a growth in the lung. A single nodule surrounded by lung tissue is called a solitary pulmonary nodule. A lung nodule might not cause any symptoms. Your doctor may have found one or more nodules on your lung when you were having a chest X-ray or CT scan. Or it may have been found during a lung cancer screening. A lung nodule may be caused by an old infection or cancer. It might also be a noncancerous growth. Lung nodules can cause a screening to give an abnormal result. Most nodules do not cause any harm. But without further tests, your doctor can't tell whether an abnormal finding is cancer, a harmless nodule, or something else. What can you expect when you have a lung nodule? Your doctor will look at several risk factors to see how likely it is that the nodule is cancer. He or she will look at:  · Whether you smoke or have ever smoked. · Your age and your family's medical history. · Whether you have ever had lung cancer. · The size, density, and other characteristics of the nodule. · Whether the nodule has changed in size. Your doctor may look at past chest X-rays or CT scans, if available, and compare them. Or you may have a series of CT scans to see if the nodule grows over time. What happens next depends on the risk of the nodule being cancer. · If you have no risk factors and the nodule is small, your doctor may advise doing nothing. · If the risk is small, your doctor may schedule follow-up appointments and CT scans later to see if the nodule is growing. · If there's a higher risk of cancer, your doctor may:  ? Do a PET scan, which may help tell if the nodule is cancerous or not. ? Take a sample of tissue from the nodule for testing. This is called a biopsy. ? Remove the nodule with surgery. Follow-up care is a key part of your treatment and safety. Be sure to make and go to all appointments, and call your doctor if you are having problems. It's also a good idea to know your test results and keep a list of the medicines you take. Where can you learn more? Go to https://SENSIMEDpeBizratings.com.STAT-Diagnostica. org and sign in to your [employer] account. Enter R300 in the [facility] box to learn more about \"Learning About Lung Nodules. \"     If you do not have an account, please click on the \"Sign Up Now\" link.   Current as of: April 29, 2020               Content Version: 12.6 © 7739-5632 Healthwise, Incorporated. Care instructions adapted under license by [facility] ([facility]). If you have questions about a medical condition or this instruction, always ask your healthcare professional. [address] any warranty or liability for your use of this information.

## 2021-02-01 NOTE — TELEPHONE ENCOUNTER
Received a referral from 2021 Rl Rodriguez PA-C for new patient pulmonary nodules/abnormal chest x-ray/history of COVID-19. Please advise when to schedule. Narrative   EXAMINATION:   CTA OF THE CHEST 1/10/2021 12:44 pm       TECHNIQUE:   CTA of the chest was performed after the administration of intravenous   contrast.  Multiplanar reformatted images are provided for review.  MIP   images are provided for review. Dose modulation, iterative reconstruction,   and/or weight based adjustment of the mA/kV was utilized to reduce the   radiation dose to as low as reasonably achievable.       COMPARISON:   Chest performed earlier today.       HISTORY:   ORDERING SYSTEM PROVIDED HISTORY: r/o Pulmonary Embolism   TECHNOLOGIST PROVIDED HISTORY:   Reason for exam:->r/o Pulmonary Embolism   Reason for Exam: SOB, CHEST PAIN, COUGHING, COVID +   Acuity: Acute   Type of Exam: Initial       FINDINGS:   Pulmonary Arteries: Pulmonary arteries are adequately opacified for   evaluation.  No evidence of intraluminal filling defect to suggest pulmonary   embolism.  Main pulmonary artery is normal in caliber.       Mediastinum: No evidence of mediastinal lymphadenopathy.  The heart and   pericardium demonstrate no acute abnormality.  There is no acute abnormality   of the thoracic aorta.       Lungs/pleura: No focal consolidation, pneumothorax or pleural effusion. Noncalcified pulmonary nodule is seen involving the right upper lobe series   3, image 48 measuring 1 cm in greatest axial dimension.  3 mm solid   noncalcified pulmonary nodule right lower lobe series 3, image 73.    Additional 3 mm solid calcified pulmonary nodule seen involving the right   upper lobe series 3, image 35.  Additional nodular areas seen involving the   lingula on series 3, image 58 measuring 1.9 x 1.0 cm.       Upper Abdomen: No acute findings.       Soft Tissues/Bones: Visualized soft tissues surrounding the chest wall   demonstrate no acute findings.         Impression   *No CT evidence of acute pulmonary embolism. *Multiple pulmonary nodules are seen within both lungs, largest measuring 1.9   x 1.0 cm within the lingula.  Please see follow-up recommendations below.       RECOMMENDATIONS:   Fleischner Society guidelines for follow-up and management of incidentally   detected pulmonary nodules:       Multiple Solid Nodules:       Nodule size greater than 8 mm   In a low-risk patient, CT at 3-6 months, then consider CT at 18-24 months. In a high-risk patient, CT at 3-6 months, then CT at 18-24 months.       - Low risk patients include individuals with minimal or absent history of   smoking and other known risk factors.       - High risk patients include individuals with a history or smoking or known   risk factors.       Radiology 2017 http://pubs. rsna.org/doi/full/10.1148/radiol. 4681672907

## 2021-02-02 LAB
A/G RATIO: 1.4 (ref 1.1–2.2)
ALBUMIN SERPL-MCNC: 4.2 G/DL (ref 3.4–5)
ALP BLD-CCNC: 127 U/L (ref 40–129)
ALT SERPL-CCNC: 28 U/L (ref 10–40)
ANION GAP SERPL CALCULATED.3IONS-SCNC: 13 MMOL/L (ref 3–16)
AST SERPL-CCNC: 18 U/L (ref 15–37)
BILIRUB SERPL-MCNC: 0.7 MG/DL (ref 0–1)
BUN BLDV-MCNC: 11 MG/DL (ref 7–20)
CALCIUM SERPL-MCNC: 9.5 MG/DL (ref 8.3–10.6)
CHLORIDE BLD-SCNC: 101 MMOL/L (ref 99–110)
CHOLESTEROL, TOTAL: 162 MG/DL (ref 0–199)
CO2: 24 MMOL/L (ref 21–32)
CREAT SERPL-MCNC: 0.7 MG/DL (ref 0.9–1.3)
FOLATE: 11.6 NG/ML (ref 4.78–24.2)
GFR AFRICAN AMERICAN: >60
GFR NON-AFRICAN AMERICAN: >60
GLOBULIN: 2.9 G/DL
GLUCOSE BLD-MCNC: 82 MG/DL (ref 70–99)
HDLC SERPL-MCNC: 43 MG/DL (ref 40–60)
LDL CHOLESTEROL CALCULATED: 94 MG/DL
POTASSIUM SERPL-SCNC: 4.3 MMOL/L (ref 3.5–5.1)
SODIUM BLD-SCNC: 138 MMOL/L (ref 136–145)
TOTAL PROTEIN: 7.1 G/DL (ref 6.4–8.2)
TRIGL SERPL-MCNC: 127 MG/DL (ref 0–150)
TSH REFLEX: 0.93 UIU/ML (ref 0.27–4.2)
VITAMIN B-12: 527 PG/ML (ref 211–911)
VITAMIN D 25-HYDROXY: 15.2 NG/ML
VLDLC SERPL CALC-MCNC: 25 MG/DL

## 2021-02-04 ENCOUNTER — TELEPHONE (OUTPATIENT)
Dept: PRIMARY CARE CLINIC | Age: 26
End: 2021-02-04

## 2021-02-04 DIAGNOSIS — E55.9 VITAMIN D DEFICIENCY: Primary | ICD-10-CM

## 2021-02-04 RX ORDER — ERGOCALCIFEROL 1.25 MG/1
50000 CAPSULE ORAL WEEKLY
Qty: 4 CAPSULE | Refills: 2 | Status: SHIPPED | OUTPATIENT
Start: 2021-02-04 | End: 2021-04-16

## 2021-02-04 NOTE — TELEPHONE ENCOUNTER
Called patients cell phone to go over labs, advise of low Vit D level, and inform patient that I sent in RX for Vit D replacement. Will need to see back in 3 months to re-check his vit d level.

## 2021-02-11 NOTE — TELEPHONE ENCOUNTER
Can you please try father's # if (unsuccessful at Patient's cell #), please give # to call back and inform them that Dr. Mikala Farrar office has been trying to reach him set up Pulmonary follow up like we talked about in office.

## 2021-02-25 NOTE — TELEPHONE ENCOUNTER
Patient has an upcoming appt with Dr. Delmon Lefort on 3/12/21 and another appt with Dr. Farzana Purcell on 4/2/21.

## 2021-03-12 ENCOUNTER — TELEPHONE (OUTPATIENT)
Dept: PULMONOLOGY | Age: 26
End: 2021-03-12

## 2021-03-12 ENCOUNTER — VIRTUAL VISIT (OUTPATIENT)
Dept: PULMONOLOGY | Age: 26
End: 2021-03-12
Payer: COMMERCIAL

## 2021-03-12 DIAGNOSIS — U07.1 COVID-19: ICD-10-CM

## 2021-03-12 DIAGNOSIS — R06.02 SHORTNESS OF BREATH: ICD-10-CM

## 2021-03-12 DIAGNOSIS — R91.8 LUNG NODULES: ICD-10-CM

## 2021-03-12 PROCEDURE — 99244 OFF/OP CNSLTJ NEW/EST MOD 40: CPT | Performed by: INTERNAL MEDICINE

## 2021-03-12 NOTE — PROGRESS NOTES
TELEHEALTH EVALUATION -- Audio/Visual (During PSQFZ-81 public health emergency)        CHIEF COMPLAINT:  covid 19, pulmonary nodules  Patient is being seen at the request of Mercedes Acosta for a consultation for abnormal chest CT/covid 19    HPI: The patient is a 82-year-old man with a past medical history of possible mild intermittent asthma in 8th grade, Asperger's syndrome and ADHD who had COVID-19 in January 2021 with significant coughing and shortness of breath. He previously worked as a  but has been unemployed during the [address] pandemic. He lives with his father. He is a non-smoker. Patient complains of shortness of breath. Symptoms include dyspnea on exertion. Symptoms began 2 months ago, gradually improving since that time. The dyspnea occurs with moderate activity. Patient denies hemoptysis . Adonna Dayhoff Aggravating factors include exertion, exercise and climbing stairs. The patient reports an exercise tolerance of approximately 1 block1 on the flat and one flight of stairs, limited primarily by dyspnea. He has an inhaler but has not used it with any type of regularity. REVIEW OF SYSTEMS:    CONSTITUTIONAL: Negative for fevers and chills  EYES: no conjunctival injection, no redness or drainage  ENT: Negative for oropharyngeal exudate, post nasal drip, sinus pain / pressure, nasal congestion, no oral ulcerations  RESPIRATORY:  No hemoptysis or pleuritic pain. CARDIOVASCULAR: +  for chest pain, palpitations, PND  GASTROINTESTINAL: N+ for nausea, vomiting, diarrhea, + constipation and abdominal pain  MUSCULOSKELETAL:  No arthralgias/arthritis or muscle weakness  HEMATOLOGICAL/LYMPH: Negative for adenopathy  SKIN:  No rash or nodules  EXTREMITIES: Negative for cyanosis or edema.   NEUROLOGICAL: Negative for unilateral weakness, speech or gait abnormalities; + anxiety and depression    Past Medical History:   Diagnosis Date    ADHD (attention deficit hyperactivity disorder)     Asperger's syndrome Past Surgical History:        Procedure Laterality Date    TESTICLE REMOVAL         Allergies:  is allergic to triamazide [hydrochlorothiazide w-triamterene]. Social History:    TOBACCO:   reports that he has never smoked. He has never used smokeless tobacco.  ETOH:   reports current alcohol use. Patient currently lives independently      Family History:       Problem Relation Age of Onset    Asthma Father     Diabetes Paternal Grandfather     Emphysema Neg Hx     Cancer Neg Hx        Current Medications:    Current Outpatient Medications:     vitamin D (ERGOCALCIFEROL) 1.25 MG (67871 UT) CAPS capsule, Take 1 capsule by mouth once a week for 28 days, Disp: 4 capsule, Rfl: 2    aspirin EC 81 MG EC tablet, Take 1 tablet by mouth daily, Disp: 30 tablet, Rfl: 0    fluticasone (FLONASE) 50 MCG/ACT nasal spray, 2 SPRAYS UP EACH NOSTRIL daily (Patient taking differently: as needed for Allergies 2 SPRAYS UP EACH NOSTRIL daily), Disp: 1 Bottle, Rfl: 11      Objective:   PHYSICAL EXAM:        VITALS:  There were no vitals taken for this visit. Constitutional:  No acute distress. Appears well developed and nourished. Overweight. Eyes: No sclera icterus. EOM intact. No visible discharge. HENT: Head is normocephalic and atraumatic. Mucus membranes are moist and the tongue appears normal. Normal appearing nose. External Ears normal.   Neck: No visualized mass. [place] is midline   Resp: No accessory muscle use. Respiratory effort normal. No visualized signs of difficulty breathing or respiratory distress. Cardiovascular: No LE edema. No jugular venous distention  Musculoskeletal: Normal gait with no signs of ataxia. Normal range of motion of the neck. Skin: No significant exanthematous lesions or discoloration noted on facial skin    Neuro: Awake. Alert. Able to follow commands. No facial asymmetry. No gaze palsy. Psych: No agitation. Normal affect. No hallucinations. Oriented to person/time/place.  No anxiety. Normal judgement and insight. DATA:      LABS:      No PFTs available for review today. IMAGING:      I personally reviewed and interpreted the following today in the office :     Chest CTA (dated 1/10/21: Pulmonary Arteries: Pulmonary arteries are adequately opacified for  evaluation.  No evidence of intraluminal filling defect to suggest pulmonary  embolism.  Main pulmonary artery is normal in caliber.     Mediastinum: No evidence of mediastinal lymphadenopathy.  The heart and  pericardium demonstrate no acute abnormality.  There is no acute abnormality  of the thoracic aorta.     Lungs/pleura: No focal consolidation, pneumothorax or pleural effusion. Noncalcified pulmonary nodule is seen involving the right upper lobe series  3, image 48 measuring 1 cm in greatest axial dimension.  3 mm solid  noncalcified pulmonary nodule right lower lobe series 3, image 73. Additional 3 mm solid calcified pulmonary nodule seen involving the right  upper lobe series 3, image 35.  Additional nodular areas seen involving the  lingula on series 3, image 58 measuring 1.9 x 1.0 cm. ASSESSMENT AND PLAN:      Lung nodules  -I think most likely represents foci of infection with COVID-19  -We will repeat chest CT in 3 months per recommendations-approximately 4/21  -Patient is a lifetime non-smoker    Shortness of breath    The etiology of the patient's dyspnea is not clear. The Ddx is broad and includes obstructive lung disease, other pulmonary diseases (such as interstitial lung disease or pulmonary arterial hypertension),  cardiac disease, anemia or deconditioning. I plan to get full pfts with lung volumes and diffusing capacity, 6 minute walk test, and chest CT to further evaluate. COVID-19  - diagnosed in 1/21  - worsening dyspnea since that time      No follow-ups on file.     Sarahy Metzger is a 23 y.o. male being evaluated by a Virtual Visit (video visit) encounter to address concerns

## 2021-03-12 NOTE — ASSESSMENT & PLAN NOTE
-I think most likely represents foci of infection with COVID-19  -We will repeat chest CT in 3 months per recommendations-approximately 4/21  -Patient is a lifetime non-smoker

## 2021-03-12 NOTE — PATIENT INSTRUCTIONS
Remember to bring a list of pulmonary medications and any CPAP or BiPAP machines to your next appointment with the office. Please keep all of your future appointments scheduled by [facility] office. Out of respect for other patients and providers, you may be asked to reschedule your appointment if you arrive later than your scheduled appointment time. Appointments cancelled less than 24hrs in advance will be considered a no show. Patients with three missed appointments within 1 year or four missed appointments within 2 years can be dismissed from the practice. You may receive a survey regarding the care you received during your visit. Your input is valuable to us. We encourage you to complete and return your survey. We hope you will choose us in the future for your healthcare needs. Pt instructed of all future appointment dates & times, including radiology, labs, procedures & referrals. If procedures were scheduled preparation instructions provided. Instructions on future appointments with [facility] were given.

## 2021-03-12 NOTE — TELEPHONE ENCOUNTER
.Within this Telehealth Consent, the terms you and yours refer to the person using the Telehealth Service (Service), or in the case of a use of the Service by or on behalf of a minor, you and yours refer to and include (i) the parent or legal guardian who provides consent to the use of the Service by such minor or uses the Service on behalf of such minor, and (ii) the minor for whom consent is being provided or on whose behalf the Service is being utilized. When using Service, you will be consulting with your health care providers via the use of Telehealth.   Telehealth involves the delivery of healthcare services using electronic communications, information technology or other means between a healthcare provider and a patient who are not in the same physical location. Telehealth may be used for diagnosis, treatment, follow-up and/or patient education, and may include, but is not limited to, one or more of the following:    Electronic transmission of medical records, photo images, personal health information or other data between a patient and a healthcare provider    Interactions between a patient and healthcare provider via audio, video and/or data communications    Use of output data from medical devices, sound and video files    Anticipated Benefits   The use of Telehealth by your Provider(s) through the Service may have the following possible benefits:    Making it easier and more efficient for you to access medical care and treatment for the conditions treated by such Provider(s) utilizing the Service    Allowing you to obtain medical care and treatment by Provider(s) at times that are convenient for you    Enabling you to interact with Provider(s) without the necessity of an in-office appointment     Possible Risks   While the use of Telehealth can provide potential benefits for you, there are also potential risks associated with the use of Telehealth.  These risks include, but may not be limited to the following:    Your Provider(s) may not able to provide medical treatment for your particular condition and you may be required to seek alternative healthcare or emergency care services.  The electronic systems or other security protocols or safeguards used in the Service could fail, causing a breach of privacy of your medical or other information.  Given regulatory requirements in certain jurisdictions, your Provider(s) diagnosis and/or treatment options, especially pertaining to certain prescriptions, may be limited. Acceptance   1. You understand that Services will be provided via Telehealth. This process involves the use of HIPAA compliant and secure, real-time audio-visual interfacing with a qualified and appropriately trained provider located at [facility]. 2. You understand that, under no circumstances, will this session be recorded. 3. You understand that the Provider(s) at [facility] and other clinical participants will be party to the information obtained during the Telehealth session in accordance with best medical practices. 4. You understand that the information obtained during the Telehealth session will be used to help determine the most appropriate treatment options. 5. You understand that You have the right to revoke this consent at any point in time. 6. You understand that Telehealth is voluntary, and that continued treatment is not dependent upon consent. 7. You understand that, in the event of non-consent to Telehealth services and/or technical difficulties, you will obtain services as typically provided in the absence of Telehealth technology. 8. You understand that this consent will be kept in Your medical record. 9. No potential benefits from the use of Telehealth or specific results can be guaranteed. Your condition may not be cured or improved and, in some cases, may get worse.    10. There are limitations in the provision of medical care and treatment via Telehealth and the Service and you may not be able to receive diagnosis and/or treatment through the Service for every condition for which you seek diagnosis and/or treatment. 11. There are potential risks to the use of Telehealth, including but not limited to the risks described in this Telehealth Consent. 12. Your Provider(s) have discussed the use of Telehealth and the Service with you, including the benefits and risks of such and you have provided oral consent to your Provider(s) for the use of Telehealth and the Service. 15. You understand that it is your duty to provide your Provider(s) truthful, accurate and complete information, including all relevant information regarding care that you may have received or may be receiving from other healthcare providers outside of the Service. 14. You understand that each of your Provider(s) may determine in his or sole discretion that your condition is not suitable for diagnosis and/or treatment using the Service, and that you may need to seek medical care and treatment a specialist or other healthcare provider, outside of the Service. 15. You understand that you are fully responsible for payment for all services provided by Provider(s) or through use of the Service and that you may not be able to use third-party insurance. 16. You represent that (a) you have read this Telehealth Consent carefully, (b) you understand the risks and benefits of the Service and the use of Telehealth in the medical care and treatment provided to you by Provider(s) using the Service, and (c) you have the legal capacity and authority to provide this consent for yourself and/or the minor for which you are consenting under applicable federal and state laws, including laws relating to the age of [de-identified] and/or parental/guardian consent.    17. You give your informed consent to the use of Telehealth by Provider(s) using the Service under the terms described in the Terms of Service and this Telehealth Consent. The patient was read the following statement and has consented to the visit as of 3/12/21. The patient has been scheduled for their first telehealth visit on 3/12/21 with Dr. Bhupendra Ortega  .

## 2021-03-12 NOTE — ASSESSMENT & PLAN NOTE
The etiology of the patient's dyspnea is not clear. The Ddx is broad and includes obstructive lung disease, other pulmonary diseases (such as interstitial lung disease or pulmonary arterial hypertension),  cardiac disease, anemia or deconditioning. I plan to get full pfts with lung volumes and diffusing capacity, 6 minute walk test, and chest CT to further evaluate.

## 2021-03-31 NOTE — PROGRESS NOTES
[address] Office consultation  Note  4/2/2021     Subjective:  Mr. Terri Weber presents to establish cardiology care. Referred by his PCP,  Iraida Garcia, for cardiomegaly and dyspnea on exertion. Patient has Aspergers spectrum. HPI: Elzbieta Pete 24 y/o presents today, referred by PCP, Dr. Radhika Delaney for c/o cardiomegaly. Chest Xray 1/10/21 demonstrated enlargement of cardiac silhouette. EKG demonstrated no abnormality except . His father is present at visit. States that he had COVID in January 2021. He reports walking or walking upstairs he gets SOB. States he is taking ASA since diagnosed with Covid due to pulmonary nodules. Father states he had SVT in 2012. Nothing documented . Has not occurred since then. He is not currently working. His father states he stays at home and plays video games. More sedentary. Patient reports he was going to school 2 years ago for computer science. He is thinking about doing this again. He denies chest pain, dizziness, palpitations, or syncope. Review of Systems:         12 point ROS negative in all areas as listed below except as in [place]  Constitutional, EENT, GI, , Musculoskeletal, skin, neurological, hematological, endocrine, Psychiatric    Reviewed past medical history, social, and family history.    Smoking none alcohol none   He has ADHD and Asperger's syndrome   Father - mild valvular insufficiency   Mother - cardiomegaly - (age 37) but doing well     Past Medical History:   Diagnosis Date    ADHD (attention deficit hyperactivity disorder)     Asperger's syndrome      Past Surgical History:   Procedure Laterality Date    TESTICLE REMOVAL         Objective:   /80   Pulse 114   Temp 97.3 °F (36.3 °C)   Ht 6' (1.829 m)   Wt (!) 346 lb 6.4 oz (157.1 kg)   SpO2 96%   BMI 46.98 kg/m²     Wt Readings from Last 3 Encounters:   04/02/21 (!) 346 lb 6.4 oz (157.1 kg)   02/01/21 (!) 342 lb (155.1 kg)   01/10/21 (!) 320 lb (145.2 kg) Physical Exam:  General: No Respiratory distress, appears well developed and well nourished. Eyes:  Sclera nonicteric  Nose/Sinuses:  negative findings: nose shows no deformity, asymmetry, or inflammation, nasal mucosa normal, septum midline with no perforation or bleeding  Back:  no pain to palpation  Joint:  no active joint inflammation  Musculoskeletal:  negative  Skin:  Warm and dry  Neck:  Negative for JVD and Carotid Bruits. Chest:  Clear to auscultation, respiration easy  Cardiovascular:  RRR, S1S2 normal, no murmur, no rub or thrill. Abdomen:  Soft normal liver and spleen  Extremities:   No edema, clubbing, cyanosis,  Pulses   pedal pulses are normal.  Neuro: intact    Medications:   Outpatient Encounter Medications as of 4/2/2021   Medication Sig Dispense Refill    vitamin D (ERGOCALCIFEROL) 1.25 MG (90602 UT) CAPS capsule Take 1 capsule by mouth once a week for 28 days 4 capsule 2    aspirin EC 81 MG EC tablet Take 1 tablet by mouth daily 30 tablet 0    fluticasone (FLONASE) 50 MCG/ACT nasal spray 2 SPRAYS UP EACH NOSTRIL daily (Patient taking differently: as needed for Allergies 2 SPRAYS UP EACH NOSTRIL daily) 1 Bottle 11     No facility-administered encounter medications on file as of 4/2/2021. Lab Data:  CBC: No results for input(s): WBC, HGB, HCT, MCV, PLT in the last 72 hours. BMP: No results for input(s): NA, K, CL, CO2, PHOS, BUN, CREATININE in the last 72 hours. Invalid input(s): CA  LIVER PROFILE: No results for input(s): AST, ALT, LIPASE, BILIDIR, BILITOT, ALKPHOS in the last 72 hours. Invalid input(s):   AMYLASE,  ALB  LIPID:   Lab Results   Component Value Date    CHOL 162 02/01/2021     Lab Results   Component Value Date    TRIG 127 02/01/2021     Lab Results   Component Value Date    HDL 43 02/01/2021     Lab Results   Component Value Date    LDLCALC 94 02/01/2021     Lab Results   Component Value Date    LABVLDL 25 02/01/2021     No results found for: CHOLHDLRATIO PT/INR: No results for input(s): PROTIME, INR in the last 72 hours. A1C: No results found for: LABA1C  BNP:  No results for input(s): BNP in the last 72 hours. I have reviewed the following tests and documented in this encounter as follows:   Discussed with patient  IMAGING:   EKG 1/10/21 - ST, otherwise normal,   No ischemia or chamber enlargement  Chest Xray 1/10/21  No acute airspace disease. Enlargement of the cardiac silhouette as compared to the 2013 exam which can reflect cardiac enlargement or potentially pericardial effusion. CT CHEST 1/10/21  *No CT evidence of acute pulmonary embolism. *Multiple pulmonary nodules are seen within both lungs, largest measuring 1.9 x 1.0 cm within the lingula. Please see follow-up recommendations below. RECOMMENDATIONS: Fleischner Society guidelines for follow-up and management of incidentally detected pulmonary nodules:  Multiple Solid Nodules:  Nodule size greater than 8 mm In a low-risk patient, CT at 3-6 months, then consider CT at 18-24 months. In a high-risk patient, CT at 3-6 months, then CT at 18-24 months. - Low risk patients include individuals with minimal or absent history of smoking and other known risk factors. - High risk patients include individuals with a history or smoking or known risk factors. Assessment:  1. Shortness of breath    2. Cardiomegaly     3. Sinus tachycardia   4. Aspergers spectrum  He has sinus tachycardia with no obvious cause. I have reviewed his labs in epic he is not anemic and is euthyroid  Lipids are in normal range  Cardiomegaly is seen only on chest xray which is not specific as size of heart shadow depends on depth of inspiration. DUMONT appears to be due to obesity and poor physical conditioning. Plan:  1. Echocardiogram to view size and strength of the heart   2. We will call you with the results  3. Recommend healthy lifestyle - eating healthy and getting plenty of exercise. Remain active. low carb diet Lose some weight  4. Follow up will be dependent upon results of test   Encouraged to have covid vaccine for him and family. Note scribed by RN Aníbal Ramirez, Dr. Samantha Hernandez, personally performed the services described in this documentation, as scribed by the above signed scribe in my presence. It is both accurate and complete to my knowledge. I agree with the details independently gathered by the clinical support staff, while the remaining scribed note accurately describes my personal service to the patient.         [facility] Cortney Barnes MD 4/2/2021 1:37 PM

## 2021-04-01 ENCOUNTER — TELEPHONE (OUTPATIENT)
Dept: PULMONOLOGY | Age: 26
End: 2021-04-01

## 2021-04-01 NOTE — TELEPHONE ENCOUNTER
Patient calling requesting to schedule his CT outside of [facility] @ [facility] ph [phone] Fax: [phone] scheduled in April at [facility] will need to be cancelled. LOV: 3/12/21    ASSESSMENT AND PLAN:        Lung nodules  -I think most likely represents foci of infection with COVID-19  -We will repeat chest CT in 3 months per recommendations-approximately 4/21  -Patient is a lifetime non-smoker     Shortness of breath     The etiology of the patient's dyspnea is not clear.  The Ddx is broad and includes obstructive lung disease, other pulmonary diseases (such as interstitial lung disease or pulmonary arterial hypertension),  cardiac disease, anemia or deconditioning.      I plan to get full pfts with lung volumes and diffusing capacity, 6 minute walk test, and chest CT to further evaluate.         COVID-19  - diagnosed in 1/21  - worsening dyspnea since that time

## 2021-04-01 NOTE — TELEPHONE ENCOUNTER
Called [facility] and [facility] imaging is not eligible for coverage under pt plan. Spoke with pt whom asked father and pt states that he will proceed with self pay CT at [facility]. Advised pt to have done as soon as possible to leave time for imaging disc to be sent to office via mail. Order faxed. Will f/u to make sure pt gets scheduled and request disc.

## 2021-04-02 ENCOUNTER — OFFICE VISIT (OUTPATIENT)
Dept: CARDIOLOGY CLINIC | Age: 26
End: 2021-04-02
Payer: COMMERCIAL

## 2021-04-02 VITALS
WEIGHT: 315 LBS | BODY MASS INDEX: 42.66 KG/M2 | DIASTOLIC BLOOD PRESSURE: 80 MMHG | SYSTOLIC BLOOD PRESSURE: 124 MMHG | HEIGHT: 72 IN | HEART RATE: 114 BPM | OXYGEN SATURATION: 96 % | TEMPERATURE: 97.3 F

## 2021-04-02 DIAGNOSIS — R06.02 SHORTNESS OF BREATH: Primary | ICD-10-CM

## 2021-04-02 DIAGNOSIS — I51.7 CARDIOMEGALY: ICD-10-CM

## 2021-04-02 PROCEDURE — 99244 OFF/OP CNSLTJ NEW/EST MOD 40: CPT | Performed by: INTERNAL MEDICINE

## 2021-04-02 NOTE — PATIENT INSTRUCTIONS
Plan:  1. Echocardiogram to view size and strength of the heart   2. We will call you with the results  3. Recommend healthy lifestyle - eating healthy and getting plenty of exercise. Remain active. 4. Follow up will be dependent upon results of test     Your provider has ordered testing for further evaluation. An order/prescription has been included in your paper work.  To schedule outpatient testing, contact Central Scheduling by calling [employer] ([phone]).

## 2021-04-08 NOTE — TELEPHONE ENCOUNTER
Immediate Brief Procedure Note for Pain management    Patient: Vic Gutierrez  MRN: [id number]    Procedure(s):  Transforaminal Epidural Injection Lumbar/Sacral bilateral L2-3 and L4-5    Pre-Operative Diagnosis:  M51.26 Displacement of lumbar intervertebral disc without myelopathy  (primary encounter diagnosis)  M54.16 Lumbar radiculopathy    Post-operative Diagnosis:  M51.26 Displacement of lumbar intervertebral disc without myelopathy  (primary encounter diagnosis)  M54.16 Lumbar radiculopathy    Procedural Physician: Clifford Eduardo MD    Assistant: none    Sedation: None    Findings: same    Specimen(s): none    Estimated Blood Loss :  None    Complications: None   Patient called with message left for patient to call back to office to find out if pt scheduled CT.

## 2021-04-12 ENCOUNTER — HOSPITAL ENCOUNTER (OUTPATIENT)
Dept: PULMONOLOGY | Age: 26
Discharge: HOME | End: 2021-04-12
Payer: COMMERCIAL

## 2021-04-12 ENCOUNTER — APPOINTMENT (OUTPATIENT)
Dept: CT IMAGING | Age: 26
End: 2021-04-12
Payer: COMMERCIAL

## 2021-04-12 DIAGNOSIS — R06.02 SHORTNESS OF BREATH: ICD-10-CM

## 2021-04-12 LAB
DLCO %PRED: 85 %
DLCO PRED: NORMAL
DLCO/VA %PRED: NORMAL
DLCO/VA PRED: NORMAL
DLCO/VA: NORMAL
DLCO: NORMAL
EXPIRATORY TIME-POST: NORMAL
EXPIRATORY TIME: NORMAL
FEF 25-75% %CHNG: NORMAL
FEF 25-75% %PRED-POST: NORMAL
FEF 25-75% %PRED-PRE: NORMAL
FEF 25-75% PRED: NORMAL
FEF 25-75%-POST: NORMAL
FEF 25-75%-PRE: NORMAL
FEV1 %PRED-POST: 103 %
FEV1 %PRED-PRE: 101 %
FEV1 PRED: NORMAL
FEV1-POST: NORMAL
FEV1-PRE: NORMAL
FEV1/FVC %PRED-POST: NORMAL
FEV1/FVC %PRED-PRE: NORMAL
FEV1/FVC PRED: NORMAL
FEV1/FVC-POST: 85 %
FEV1/FVC-PRE: 82 %
FVC %PRED-POST: NORMAL
FVC %PRED-PRE: NORMAL
FVC PRED: NORMAL
FVC-POST: NORMAL
FVC-PRE: NORMAL
GAW %PRED: NORMAL
GAW PRED: NORMAL
GAW: NORMAL
IC %PRED: NORMAL
IC PRED: NORMAL
IC: NORMAL
MEP: NORMAL
MIP: NORMAL
MVV %PRED-PRE: NORMAL
MVV PRED: NORMAL
MVV-PRE: NORMAL
PEF %PRED-POST: NORMAL
PEF %PRED-PRE: NORMAL
PEF PRED: NORMAL
PEF%CHNG: NORMAL
PEF-POST: NORMAL
PEF-PRE: NORMAL
RAW %PRED: NORMAL
RAW PRED: NORMAL
RAW: NORMAL
RV %PRED: NORMAL
RV PRED: NORMAL
RV: NORMAL
SVC %PRED: NORMAL
SVC PRED: NORMAL
SVC: NORMAL
TLC %PRED: 87 %
TLC PRED: NORMAL
TLC: NORMAL
VA %PRED: NORMAL
VA PRED: NORMAL
VA: NORMAL
VTG %PRED: NORMAL
VTG PRED: NORMAL
VTG: NORMAL

## 2021-04-12 PROCEDURE — 94640 AIRWAY INHALATION TREATMENT: CPT

## 2021-04-12 PROCEDURE — 94618 PULMONARY STRESS TESTING: CPT

## 2021-04-12 PROCEDURE — 94726 PLETHYSMOGRAPHY LUNG VOLUMES: CPT

## 2021-04-12 PROCEDURE — 94060 EVALUATION OF WHEEZING: CPT

## 2021-04-12 PROCEDURE — 6370000000 HC RX 637 (ALT 250 FOR IP): Performed by: INTERNAL MEDICINE

## 2021-04-12 PROCEDURE — 94729 DIFFUSING CAPACITY: CPT

## 2021-04-12 RX ORDER — ALBUTEROL SULFATE 90 UG/1
2 AEROSOL, METERED RESPIRATORY (INHALATION) ONCE
Status: COMPLETED | OUTPATIENT
Start: 2021-04-12 | End: 2021-04-12

## 2021-04-12 RX ADMIN — Medication 2 PUFF: at 15:13

## 2021-04-12 ASSESSMENT — PULMONARY FUNCTION TESTS
FEV1/FVC_PRE: 82
FEV1/FVC_POST: 85
FEV1_PERCENT_PREDICTED_PRE: 101
FEV1_PERCENT_PREDICTED_POST: 103

## 2021-04-15 NOTE — PROCEDURES
[address]                 [facility]                               PULMONARY FUNCTION    PATIENT NAME: Adriana Goldsmith                :        1995  MED REC NO:   [id number]                          ROOM:  ACCOUNT NO:   [de-identified]                           ADMIT DATE: 2021  PROVIDER:     Fatou Garcia MD    DATE OF PROCEDURE:  2021    ATTENDING PROVIDER:  Sarah Trujillo MD    INDICATION:  Shortness of breath. FINDINGS:  1. Spirometry: The FVC is 102% of predicted. The FEV1 is 101% of  predicted. The FEV1/FVC ratio is 0.82. There is no response to  bronchodilators. 2.  Plethysmography:  The total lung capacity is 87% of  predicted. 3.  The diffusion capacity for carbon monoxide is 85% of predicted. 4.  The flow volume loop is within normal limits. IMPRESSION:  This patient has normal spirometry and plethysmography and  diffusion capacity. This does not explain the stated symptoms, and  clinical correlation is recommended. A six-minute walk test was conducted per [facility]  protocol. The  patient walked 1260 feet on room air with no significant  oxyhemoglobin  desaturation. The heart rate at rest was 105 and heart rate maximum was  152.         Pamela Briones MD    D: 2021 15:46:37       T: 04/15/2021 1:09:06     [id number]  Job#: [id number]     Doc#: [id number]    CC:

## 2021-04-16 ENCOUNTER — VIRTUAL VISIT (OUTPATIENT)
Dept: PULMONOLOGY | Age: 26
End: 2021-04-16
Payer: COMMERCIAL

## 2021-04-16 DIAGNOSIS — R06.02 SHORTNESS OF BREATH: ICD-10-CM

## 2021-04-16 DIAGNOSIS — R91.8 LUNG NODULES: ICD-10-CM

## 2021-04-16 DIAGNOSIS — U07.1 COVID-19: Primary | ICD-10-CM

## 2021-04-16 PROCEDURE — 99214 OFFICE O/P EST MOD 30 MIN: CPT | Performed by: INTERNAL MEDICINE

## 2021-04-16 NOTE — PROGRESS NOTES
TELEHEALTH EVALUATION -- Audio/Visual (During [facility]-57 public health emergency)        CHIEF COMPLAINT:  covid 19, pulmonary nodules  Patient is being seen at the request of Rebekah Mendieta for a consultation for abnormal chest CT/covid 19    HPI:   Presenting hx: The patient is a 81-year-old man with a past medical history of possible mild intermittent asthma in 8th grade, Asperger's syndrome and ADHD who had COVID-19 in January 2021 with significant coughing and shortness of breath. He previously worked as a  but has been unemployed during the [employer] pandemic. He lives with his father. He is a non-smoker. Patient complains of shortness of breath. Symptoms include dyspnea on exertion. Symptoms began 2 months ago, gradually improving since that time. The dyspnea occurs with moderate activity. Patient denies hemoptysis . Vera Cason Aggravating factors include exertion, exercise and climbing stairs. The patient reports an exercise tolerance of approximately 1 block1 on the flat and one flight of stairs, limited primarily by dyspnea. He has an inhaler but has not used it with any type of regularity. Since last clinic visit, the patient reports that his dyspnea is less. He has been able to walk more. Past Medical History:   Diagnosis Date    ADHD (attention deficit hyperactivity disorder)     Asperger's syndrome        Past Surgical History:        Procedure Laterality Date    TESTICLE REMOVAL         Allergies:  is allergic to triamazide [hydrochlorothiazide w-triamterene]. Social History:    TOBACCO:   reports that he has never smoked. He has never used smokeless tobacco.  ETOH:   reports current alcohol use.   Patient currently lives independently      Family History:       Problem Relation Age of Onset    Asthma Father     Diabetes Paternal Grandfather     Emphysema Neg Hx     Cancer Neg Hx        Current Medications:    Current Outpatient Medications:     vitamin D (ERGOCALCIFEROL) 1.25 MG (86582 to suggest pulmonary  embolism.  Main pulmonary artery is normal in caliber.     Mediastinum: No evidence of mediastinal lymphadenopathy.  The heart and  pericardium demonstrate no acute abnormality.  There is no acute abnormality  of the thoracic aorta.     Lungs/pleura: No focal consolidation, pneumothorax or pleural effusion. Noncalcified pulmonary nodule is seen involving the right upper lobe series  3, image 48 measuring 1 cm in greatest axial dimension.  3 mm solid  noncalcified pulmonary nodule right lower lobe series 3, image 73. Additional 3 mm solid calcified pulmonary nodule seen involving the right  upper lobe series 3, image 35.  Additional nodular areas seen involving the  lingula on series 3, image 58 measuring 1.9 x 1.0 cm. ASSESSMENT AND PLAN:     Lung nodules  -I think most likely represents foci of infection with COVID-19  -Improved upon follow-up imaging per radiology  -Patient is a lifetime non-smoker     Shortness of breath  The etiology of the patient's dyspnea is most likely deconditioning and COVID-19.  -PFTs are reassuring for absence of lung disease and CT is normal per report by radiology      COVID-19  - diagnosed in 1/21  - improving clinically    Michael Lindo is a 23 y.o. male being evaluated by a Virtual Visit (video visit) encounter to address concerns as mentioned above. A caregiver was present when appropriate. Due to this being a TeleHealth encounter (During [facility]-58 public health emergency), evaluation of the following organ systems was limited: Vitals/Constitutional/EENT/Resp/CV/GI//MS/Neuro/Skin/Heme-Lymph-Imm.   Pursuant to the emergency declaration under the [address] [place] [address] authority and the [employer] Act, this Virtual Visit was conducted with patient's (and/or legal guardian's) consent, to reduce the patient's risk of exposure to COVID-19 and provide necessary medical care. The patient (and/or legal guardian) has also been advised to contact this office for worsening conditions or problems, and seek emergency medical treatment and/or call 911 if deemed necessary. Patient identification was verified at the start of the visit: Yes    Total time spent on this encounter: Not billed by time    Services were provided through a video synchronous discussion virtually to substitute for in-person clinic visit. Patient and provider were located at their individual homes. --Jared Núñez MD on 4/16/2021 at 9:14 AM    An electronic signature was used to authenticate this note.     Orders  -F/u prn after I personally review CT

## 2021-04-21 NOTE — TELEPHONE ENCOUNTER
Imaging disc rec'd and loaded for Dr. Swann Client to review. Sent result note to Dr. Swann Client notifying.

## 2022-01-19 ENCOUNTER — OFFICE VISIT (OUTPATIENT)
Dept: PRIMARY CARE CLINIC | Age: 27
End: 2022-01-19
Payer: COMMERCIAL

## 2022-01-19 DIAGNOSIS — R51.9 ACUTE INTRACTABLE HEADACHE, UNSPECIFIED HEADACHE TYPE: ICD-10-CM

## 2022-01-19 DIAGNOSIS — R53.83 FATIGUE, UNSPECIFIED TYPE: Primary | ICD-10-CM

## 2022-01-19 DIAGNOSIS — K08.89 TOOTH PAIN: ICD-10-CM

## 2022-01-19 DIAGNOSIS — J02.9 PHARYNGITIS, UNSPECIFIED ETIOLOGY: ICD-10-CM

## 2022-01-19 LAB — S PYO AG THROAT QL: POSITIVE

## 2022-01-19 PROCEDURE — 87880 STREP A ASSAY W/OPTIC: CPT | Performed by: NURSE PRACTITIONER

## 2022-01-19 PROCEDURE — 99214 OFFICE O/P EST MOD 30 MIN: CPT | Performed by: NURSE PRACTITIONER

## 2022-01-19 RX ORDER — AMOXICILLIN 875 MG/1
875 TABLET, COATED ORAL 2 TIMES DAILY
Qty: 20 TABLET | Refills: 0 | Status: SHIPPED | OUTPATIENT
Start: 2022-01-19 | End: 2022-01-29

## 2022-01-19 RX ORDER — METHYLPREDNISOLONE 4 MG/1
TABLET ORAL
Qty: 1 KIT | Refills: 0 | Status: SHIPPED
Start: 2022-01-19 | End: 2022-02-28 | Stop reason: CLARIF

## 2022-01-19 ASSESSMENT — ENCOUNTER SYMPTOMS
NAUSEA: 0
ABDOMINAL PAIN: 0
SWOLLEN GLANDS: 0
CHANGE IN BOWEL HABIT: 0
COUGH: 0
VISUAL CHANGE: 0
VOMITING: 0
SORE THROAT: 1

## 2022-01-19 NOTE — PROGRESS NOTES
Luis Miguel Hirsch (:  1995) is a 27 y.o. male,Established patient, here for evaluation of the following chief complaint(s):  Illness         ASSESSMENT/PLAN:  1. Fatigue, unspecified type  -     COVID-19  -     POCT rapid strep A  2. Acute intractable headache, unspecified headache type  -     COVID-19  -     methylPREDNISolone (MEDROL DOSEPACK) 4 MG tablet; Take by mouth., Disp-1 kit, R-0Normal  -     POCT rapid strep A  3. Pharyngitis, unspecified etiology  -     COVID-19  -     methylPREDNISolone (MEDROL DOSEPACK) 4 MG tablet; Take by mouth., Disp-1 kit, R-0Normal  -     amoxicillin (AMOXIL) 875 MG tablet; Take 1 tablet by mouth 2 times daily for 10 days, Disp-20 tablet, R-0Normal  -     POCT rapid strep A  4. Tooth pain  -     amoxicillin (AMOXIL) 875 MG tablet; Take 1 tablet by mouth 2 times daily for 10 days, Disp-20 tablet, R-0Normal      No follow-ups on file. Subjective   SUBJECTIVE/OBJECTIVE:  Car side visit with dad  Broken tooth  Feels warm, fatigue, headache, sore throat- hard to swallow for a few days    Pharyngitis  This is a new problem. The current episode started in the past 7 days. The problem occurs constantly. The problem has been unchanged. Associated symptoms include diaphoresis, fatigue, headaches and a sore throat. Pertinent negatives include no abdominal pain, anorexia, arthralgias, change in bowel habit, chest pain, chills, congestion, coughing, fever, joint swelling, myalgias, nausea, neck pain, numbness, rash, swollen glands, urinary symptoms, vertigo, visual change, vomiting or weakness. The symptoms are aggravated by drinking and eating. Review of Systems   Constitutional: Positive for activity change, appetite change, diaphoresis and fatigue. Negative for chills and fever. HENT: Positive for sore throat. Negative for congestion. Respiratory: Negative for cough. Cardiovascular: Negative for chest pain.    Gastrointestinal: Negative for abdominal pain, anorexia, change in bowel habit, nausea and vomiting. Musculoskeletal: Negative for arthralgias, joint swelling, myalgias and neck pain. Skin: Negative for rash. Neurological: Positive for headaches. Negative for vertigo, weakness and numbness. Objective   Physical Exam  Constitutional:       Appearance: He is obese. He is ill-appearing. HENT:      Head: Normocephalic. Right Ear: External ear normal.      Left Ear: External ear normal.      Nose: Congestion present. Mouth/Throat:      Mouth: Mucous membranes are moist.      Pharynx: Posterior oropharyngeal erythema present. Comments: 3+ tonsils  Eyes:      Extraocular Movements: Extraocular movements intact. Conjunctiva/sclera: Conjunctivae normal.      Pupils: Pupils are equal, round, and reactive to light. Pulmonary:      Breath sounds: Normal breath sounds. Musculoskeletal:         General: Normal range of motion. Cervical back: Normal range of motion. Skin:     General: Skin is warm. Capillary Refill: Capillary refill takes less than 2 seconds. Neurological:      General: No focal deficit present. Mental Status: He is alert and oriented to person, place, and time. An electronic signature was used to authenticate this note.     --Charly Tristan, APRN - CNP

## 2022-01-20 LAB — SARS-COV-2: NOT DETECTED

## 2022-02-28 ENCOUNTER — OFFICE VISIT (OUTPATIENT)
Dept: PRIMARY CARE CLINIC | Age: 27
End: 2022-02-28
Payer: COMMERCIAL

## 2022-02-28 VITALS
BODY MASS INDEX: 34.45 KG/M2 | WEIGHT: 254 LBS | OXYGEN SATURATION: 98 % | SYSTOLIC BLOOD PRESSURE: 102 MMHG | TEMPERATURE: 98 F | HEART RATE: 103 BPM | DIASTOLIC BLOOD PRESSURE: 60 MMHG

## 2022-02-28 DIAGNOSIS — J02.0 STREP PHARYNGITIS: Primary | ICD-10-CM

## 2022-02-28 DIAGNOSIS — J02.9 PHARYNGITIS, UNSPECIFIED ETIOLOGY: ICD-10-CM

## 2022-02-28 PROCEDURE — 99213 OFFICE O/P EST LOW 20 MIN: CPT

## 2022-02-28 RX ORDER — CEFDINIR 300 MG/1
300 CAPSULE ORAL 2 TIMES DAILY
Qty: 20 CAPSULE | Refills: 0 | Status: SHIPPED | OUTPATIENT
Start: 2022-02-28 | End: 2022-03-10

## 2022-02-28 RX ORDER — IBUPROFEN 200 MG
200 TABLET ORAL EVERY 6 HOURS PRN
COMMUNITY

## 2022-02-28 ASSESSMENT — ENCOUNTER SYMPTOMS
VOMITING: 0
SINUS PRESSURE: 0
TROUBLE SWALLOWING: 0
COUGH: 0
DIARRHEA: 0
NAUSEA: 0
SORE THROAT: 1
SHORTNESS OF BREATH: 0
RHINORRHEA: 0

## 2022-02-28 NOTE — PROGRESS NOTES
Dispense Refill    ibuprofen (ADVIL;MOTRIN) 200 MG tablet Take 200 mg by mouth every 6 hours as needed for Pain      aspirin EC 81 MG EC tablet Take 1 tablet by mouth daily 30 tablet 0    fluticasone (FLONASE) 50 MCG/ACT nasal spray 2 SPRAYS UP EACH NOSTRIL daily 1 Bottle 11    vitamin D (ERGOCALCIFEROL) 1.25 MG (37023 UT) CAPS capsule Take 1 capsule by mouth once a week for 28 days 4 capsule 2     No current facility-administered medications on file prior to visit. Past Medical History:   Diagnosis Date    ADHD (attention deficit hyperactivity disorder)     Asperger's syndrome      Patient Active Problem List   Diagnosis    Lung nodules    Shortness of breath    COVID-19    Cardiomegaly       PE  Vitals:    02/28/22 1550   BP: 102/60   Pulse: 103   Temp: 98 °F (36.7 °C)   TempSrc: Temporal   SpO2: 98%   Weight: 254 lb (115.2 kg)     Estimated body mass index is 34.45 kg/m² as calculated from the following:    Height as of 4/2/21: 6' (1.829 m). Weight as of this encounter: 254 lb (115.2 kg). Physical Exam  Vitals reviewed. Constitutional:       Appearance: Normal appearance. He is obese. He is not ill-appearing. HENT:      Head: Normocephalic. Right Ear: Ear canal and external ear normal. A middle ear effusion is present. Left Ear: Tympanic membrane, ear canal and external ear normal.      Ears:      Comments: Small amt of fluid behind right TM     Nose: Nose normal. No congestion. Mouth/Throat:      Mouth: Mucous membranes are moist.      Pharynx: Posterior oropharyngeal erythema present. No oropharyngeal exudate. Eyes:      Extraocular Movements: Extraocular movements intact. Pupils: Pupils are equal, round, and reactive to light. Cardiovascular:      Pulses: Normal pulses. Heart sounds: Normal heart sounds. Pulmonary:      Effort: Pulmonary effort is normal.      Breath sounds: Normal breath sounds. Abdominal:      General: Abdomen is flat.  Bowel sounds are normal.      Palpations: Abdomen is soft. Musculoskeletal:         General: Normal range of motion. Cervical back: Normal range of motion and neck supple. Skin:     General: Skin is warm and dry. Capillary Refill: Capillary refill takes less than 2 seconds. Neurological:      General: No focal deficit present. Mental Status: He is alert. Psychiatric:         Mood and Affect: Mood normal.      Comments: Does not maintain eye contact;  Hx Asperger's         Sandi Shook, APRN - CNP

## 2022-02-28 NOTE — PATIENT INSTRUCTIONS
Provided a new toothbrush. Discussed washing all utensils, cups, straws, and water bottles that the child has used to prevent reinfection. Do not share cups or straws. Patient Education        Strep Throat: Care Instructions  Your Care Instructions     Strep throat is a bacterial infection that causes sudden, severe sore throat and fever. Strep throat, which is caused by bacteria called streptococcus, is treated with antibiotics. Sometimes a strep test is necessary to tell if the sore throat is caused by strep bacteria. Treatment can help ease symptoms and may prevent future problems. Follow-up care is a key part of your treatment and safety. Be sure to make and go to all appointments, and call your doctor if you are having problems. It's also a good idea to know your test results and keep a list of the medicines you take. How can you care for yourself at home? · Take your antibiotics as directed. Do not stop taking them just because you feel better. You need to take the full course of antibiotics. · Strep throat can spread to others until 24 hours after you begin taking antibiotics. During this time, avoid contact with other people at work, school, or home, especially infants and children. Do not sneeze or cough on others, and wash your hands often. Keep your drinking glass and eating utensils separate from those of others. Wash these items well in hot, soapy water. · Gargle with warm salt water at least once each hour to help reduce swelling and make your throat feel better. Use 1 teaspoon of salt mixed in 8 fluid ounces of warm water. · Take an over-the-counter pain medication, such as acetaminophen (Tylenol), ibuprofen (Advil, Motrin), or naproxen (Aleve). Read and follow all instructions on the label. · Try an over-the-counter anesthetic throat spray or throat lozenges, which may help relieve throat pain. · Drink plenty of fluids. Fluids may help soothe an irritated throat.  Hot fluids, such as tea or soup, may help your throat feel better. · Eat soft solids and drink plenty of clear liquids. Flavored ice pops, ice cream, scrambled eggs, sherbet, and gelatin dessert (such as Jell-O) may also soothe the throat. · Get lots of rest.  · Do not smoke, and avoid secondhand smoke. If you need help quitting, talk to your doctor about stop-smoking programs and medicines. These can increase your chances of quitting for good. · Use a vaporizer or humidifier to add moisture to the air in your bedroom. Follow the directions for cleaning the machine. When should you call for help? Call your doctor now or seek immediate medical care if:    · You have a new or higher fever.     · You have a fever with a stiff neck or severe headache.     · You have new or worse trouble swallowing.     · Your sore throat gets much worse on one side.     · Your pain becomes much worse on one side of your throat. Watch closely for changes in your health, and be sure to contact your doctor if:    · You are not getting better after 2 days (48 hours).     · You do not get better as expected. Where can you learn more? Go to [email or web address]://[employer]. org and sign in to your myeasydocs account. Enter K625 in the [facility] box to learn more about \"Strep Throat: Care Instructions. \"     If you do not have an account, please click on the \"Sign Up Now\" link. Current as of: September 8, 2021               Content Version: 13.1  © 2006-2021 [employer]wise, Incorporated. Care instructions adapted under license by [facility] ([facility]). If you have questions about a medical condition or this instruction, always ask your healthcare professional. [place] [address] any warranty or liability for your use of this information.

## 2023-04-11 PROBLEM — U07.1 COVID-19: Status: RESOLVED | Noted: 2021-03-12 | Resolved: 2023-04-11

## 2023-04-18 ENCOUNTER — OFFICE VISIT (OUTPATIENT)
Dept: PRIMARY CARE CLINIC | Age: 28
End: 2023-04-18
Payer: COMMERCIAL

## 2023-04-18 VITALS
DIASTOLIC BLOOD PRESSURE: 62 MMHG | BODY MASS INDEX: 49.1 KG/M2 | TEMPERATURE: 98 F | OXYGEN SATURATION: 96 % | WEIGHT: 315 LBS | HEART RATE: 78 BPM | SYSTOLIC BLOOD PRESSURE: 108 MMHG

## 2023-04-18 DIAGNOSIS — F90.2 ATTENTION DEFICIT HYPERACTIVITY DISORDER, COMBINED TYPE: Primary | ICD-10-CM

## 2023-04-18 DIAGNOSIS — E55.9 VITAMIN D DEFICIENCY: ICD-10-CM

## 2023-04-18 DIAGNOSIS — F41.1 GAD (GENERALIZED ANXIETY DISORDER): ICD-10-CM

## 2023-04-18 DIAGNOSIS — F84.0 AUTISM SPECTRUM DISORDER: ICD-10-CM

## 2023-04-18 PROCEDURE — 99213 OFFICE O/P EST LOW 20 MIN: CPT

## 2023-04-18 ASSESSMENT — ENCOUNTER SYMPTOMS
COUGH: 0
SHORTNESS OF BREATH: 0
GASTROINTESTINAL NEGATIVE: 1

## 2023-04-18 NOTE — PATIENT INSTRUCTIONS
Call to give me an update in 2 weeks to let me know how you are doing with the medication; we will likely increase the dose at that time. You were on 70mg in the past, we are starting you back on 30mg and will increase from there.

## 2023-04-18 NOTE — PROGRESS NOTES
normal.      Comments: Very pleasant  Participates fully in visit    Moderate eye contact    Hygiene is fair        Juve Prieto, APRN - CNP

## 2023-04-19 LAB
25(OH)D3 SERPL-MCNC: 17.4 NG/ML
ALBUMIN SERPL-MCNC: 4.6 G/DL (ref 3.4–5)
ALBUMIN/GLOB SERPL: 1.6 {RATIO} (ref 1.1–2.2)
ALP SERPL-CCNC: 116 U/L (ref 40–129)
ALT SERPL-CCNC: 19 U/L (ref 10–40)
ANION GAP SERPL CALCULATED.3IONS-SCNC: 19 MMOL/L (ref 3–16)
AST SERPL-CCNC: 16 U/L (ref 15–37)
BILIRUB SERPL-MCNC: 0.3 MG/DL (ref 0–1)
BUN SERPL-MCNC: 15 MG/DL (ref 7–20)
CALCIUM SERPL-MCNC: 10 MG/DL (ref 8.3–10.6)
CHLORIDE SERPL-SCNC: 104 MMOL/L (ref 99–110)
CHOLEST SERPL-MCNC: 183 MG/DL (ref 0–199)
CO2 SERPL-SCNC: 19 MMOL/L (ref 21–32)
CREAT SERPL-MCNC: 0.8 MG/DL (ref 0.9–1.3)
GFR SERPLBLD CREATININE-BSD FMLA CKD-EPI: >60 ML/MIN/{1.73_M2}
GLUCOSE SERPL-MCNC: 82 MG/DL (ref 70–99)
HDLC SERPL-MCNC: 48 MG/DL (ref 40–60)
LDLC SERPL CALC-MCNC: 110 MG/DL
POTASSIUM SERPL-SCNC: 4.5 MMOL/L (ref 3.5–5.1)
PROT SERPL-MCNC: 7.5 G/DL (ref 6.4–8.2)
SODIUM SERPL-SCNC: 142 MMOL/L (ref 136–145)
TRIGL SERPL-MCNC: 127 MG/DL (ref 0–150)
VLDLC SERPL CALC-MCNC: 25 MG/DL

## 2023-04-20 ENCOUNTER — TELEPHONE (OUTPATIENT)
Dept: PRIMARY CARE CLINIC | Age: 28
End: 2023-04-20

## 2023-04-20 DIAGNOSIS — F90.2 ATTENTION DEFICIT HYPERACTIVITY DISORDER, COMBINED TYPE: Primary | ICD-10-CM

## 2023-04-20 DIAGNOSIS — E55.9 VITAMIN D DEFICIENCY: ICD-10-CM

## 2023-04-20 RX ORDER — DEXTROAMPHETAMINE SACCHARATE, AMPHETAMINE ASPARTATE, DEXTROAMPHETAMINE SULFATE AND AMPHETAMINE SULFATE 3.75; 3.75; 3.75; 3.75 MG/1; MG/1; MG/1; MG/1
15 TABLET ORAL 2 TIMES DAILY
Qty: 60 TABLET | Refills: 0 | Status: SHIPPED | OUTPATIENT
Start: 2023-04-20 | End: 2023-05-20

## 2023-04-20 RX ORDER — ERGOCALCIFEROL 1.25 MG/1
50000 CAPSULE ORAL WEEKLY
Qty: 12 CAPSULE | Refills: 0 | Status: SHIPPED | OUTPATIENT
Start: 2023-04-20

## 2023-04-20 NOTE — TELEPHONE ENCOUNTER
Received a fax from [facility] regarding the PA done on the Vyvanse 30mg.    They are requesting office notes showing that he has tried and failed Adderall, Methylphenidate and Topamax in the past .

## 2023-04-20 NOTE — TELEPHONE ENCOUNTER
It has been years since he has tried medication for his ADHD-- at least since 2016 from what I can tell in [place]. I am aware that Adderall XR is on short supply, so I think our best bet is trialing the immediate release twice a day. Please reach out to Alvino's father, Etta Meza to see if he is OK with that plan. Unfortunately, I think it's very unlikely that insurance will approve his Vyvanse at this time. Thank you!

## 2023-06-05 ENCOUNTER — TELEPHONE (OUTPATIENT)
Dept: PRIMARY CARE CLINIC | Age: 28
End: 2023-06-05

## 2023-06-05 NOTE — TELEPHONE ENCOUNTER
The office received a letter from the patient's insurance [employer]: Prior Authorization Determination    It stated that the Vyvanse 30mg that was prescribed back in April that was denied and needed more information is now not needing a PA. The letter is now stating that it is a covered benefit. I called the pharmacy to find out if it would go through his insurance and it did. Due to them having a high deductible and it not being met as of this date, the two weeks that she had wrote of the Vyvanse 30mg will cost them $165.29 for the co-pay. Also as of this date the patient has not called in for the May refill. Letter has been scanned into his chart.

## 2023-06-10 DIAGNOSIS — F90.2 ATTENTION DEFICIT HYPERACTIVITY DISORDER, COMBINED TYPE: ICD-10-CM

## 2023-06-12 RX ORDER — DEXTROAMPHETAMINE SACCHARATE, AMPHETAMINE ASPARTATE, DEXTROAMPHETAMINE SULFATE AND AMPHETAMINE SULFATE 3.75; 3.75; 3.75; 3.75 MG/1; MG/1; MG/1; MG/1
15 TABLET ORAL 2 TIMES DAILY
Qty: 60 TABLET | Refills: 0 | OUTPATIENT
Start: 2023-06-12 | End: 2023-07-12

## 2023-06-12 NOTE — TELEPHONE ENCOUNTER
Per PCP notes pt was to RTC in 2 weeks time when first starting medication.  Please have him schedule a f/u

## 2023-06-12 NOTE — TELEPHONE ENCOUNTER
Patient last seen on 4/18/23 first refill of the medication. He was to return in two weeks, has not done so. Last filled on 4/20/23  If you are not comfortable filling he will need to wait until Douglas Sanchez gets back next week.

## 2024-07-10 ENCOUNTER — OFFICE VISIT (OUTPATIENT)
Dept: PRIMARY CARE CLINIC | Age: 29
End: 2024-07-10
Payer: COMMERCIAL

## 2024-07-10 VITALS
BODY MASS INDEX: 44.1 KG/M2 | HEART RATE: 87 BPM | WEIGHT: 315 LBS | DIASTOLIC BLOOD PRESSURE: 80 MMHG | RESPIRATION RATE: 14 BRPM | HEIGHT: 71 IN | SYSTOLIC BLOOD PRESSURE: 122 MMHG | TEMPERATURE: 97.6 F | OXYGEN SATURATION: 96 %

## 2024-07-10 DIAGNOSIS — F90.2 ATTENTION DEFICIT HYPERACTIVITY DISORDER, COMBINED TYPE: ICD-10-CM

## 2024-07-10 DIAGNOSIS — Z00.01 ENCOUNTER FOR WELL ADULT EXAM WITH ABNORMAL FINDINGS: Primary | ICD-10-CM

## 2024-07-10 DIAGNOSIS — E55.9 VITAMIN D DEFICIENCY: ICD-10-CM

## 2024-07-10 DIAGNOSIS — I51.7 CARDIOMEGALY: ICD-10-CM

## 2024-07-10 DIAGNOSIS — F84.0 AUTISM SPECTRUM DISORDER: ICD-10-CM

## 2024-07-10 DIAGNOSIS — Z82.49 FAMILY HISTORY OF HEART ATTACK: ICD-10-CM

## 2024-07-10 DIAGNOSIS — R60.0 BILATERAL LEG EDEMA: ICD-10-CM

## 2024-07-10 PROCEDURE — 99395 PREV VISIT EST AGE 18-39: CPT

## 2024-07-10 RX ORDER — LISDEXAMFETAMINE DIMESYLATE 30 MG/1
30 CAPSULE ORAL EVERY MORNING
Qty: 30 CAPSULE | Refills: 0 | Status: SHIPPED | OUTPATIENT
Start: 2024-07-10 | End: 2024-07-10 | Stop reason: SDUPTHER

## 2024-07-10 SDOH — ECONOMIC STABILITY: INCOME INSECURITY: HOW HARD IS IT FOR YOU TO PAY FOR THE VERY BASICS LIKE FOOD, HOUSING, MEDICAL CARE, AND HEATING?: VERY HARD

## 2024-07-10 SDOH — ECONOMIC STABILITY: FOOD INSECURITY: WITHIN THE PAST 12 MONTHS, YOU WORRIED THAT YOUR FOOD WOULD RUN OUT BEFORE YOU GOT MONEY TO BUY MORE.: NEVER TRUE

## 2024-07-10 SDOH — ECONOMIC STABILITY: HOUSING INSECURITY
IN THE LAST 12 MONTHS, WAS THERE A TIME WHEN YOU DID NOT HAVE A STEADY PLACE TO SLEEP OR SLEPT IN A SHELTER (INCLUDING NOW)?: NO

## 2024-07-10 SDOH — ECONOMIC STABILITY: FOOD INSECURITY: WITHIN THE PAST 12 MONTHS, THE FOOD YOU BOUGHT JUST DIDN'T LAST AND YOU DIDN'T HAVE MONEY TO GET MORE.: NEVER TRUE

## 2024-07-10 ASSESSMENT — ENCOUNTER SYMPTOMS
SHORTNESS OF BREATH: 0
COUGH: 0
CONSTIPATION: 0
VOMITING: 0
NAUSEA: 0

## 2024-07-10 ASSESSMENT — PATIENT HEALTH QUESTIONNAIRE - PHQ9
1. LITTLE INTEREST OR PLEASURE IN DOING THINGS: NOT AT ALL
SUM OF ALL RESPONSES TO PHQ QUESTIONS 1-9: 0
SUM OF ALL RESPONSES TO PHQ QUESTIONS 1-9: 0
SUM OF ALL RESPONSES TO PHQ9 QUESTIONS 1 & 2: 0
SUM OF ALL RESPONSES TO PHQ QUESTIONS 1-9: 0
2. FEELING DOWN, DEPRESSED OR HOPELESS: NOT AT ALL
SUM OF ALL RESPONSES TO PHQ QUESTIONS 1-9: 0

## 2024-07-10 NOTE — PATIENT INSTRUCTIONS
day, leaving 46 grams of fat to come from mono- and polyunsaturated fats.   Food Choices on a Heart Healthy Diet   Food Category   Foods Recommended   Foods to Avoid   Grains   Breads and rolls without salted tops Most dry and cooked cereals Unsalted crackers and breadsticks Low-sodium or homemade breadcrumbs or stuffing All rice and pastas   Breads, rolls, and crackers with salted tops High-fat baked goods (eg, muffins, donuts, pastries) Quick breads, self-rising flour, and biscuit mixes Regular bread crumbs Instant hot cereals Commercially prepared rice, pasta, or stuffing mixes   Vegetables   Most fresh, frozen, and low-sodium canned vegetables Low-sodium and salt-free vegetable juices Canned vegetables if unsalted or rinsed   Regular canned vegetables and juices, including sauerkraut and pickled vegetables Frozen vegetables with sauces Commercially prepared potato and vegetable mixes   Fruits   Most fresh, frozen, and canned fruits All fruit juices   Fruits processed with salt or sodium   Milk   Nonfat or low-fat (1%) milk Nonfat or low-fat yogurt Cottage cheese, low-fat ricotta, cheeses labeled as low-fat and low-sodium   Whole milk Reduced-fat (2%) milk Malted and chocolate milk Full fat yogurt Most cheeses (unless low-fat and low salt) Buttermilk (no more than 1 cup per week)   Meats and Beans   Lean cuts of fresh or frozen beef, veal, lamb, or pork (look for the word loin) Fresh or frozen poultry without the skin Fresh or frozen fish and some shellfish Egg whites and egg substitutes (Limit whole eggs to three per week) Tofu Nuts or seeds (unsalted, dry-roasted), low-sodium peanut butter Dried peas, beans, and lentils   Any smoked, cured, salted, or canned meat, fish, or poultry (including suarez, chipped beef, cold cuts, hot dogs, sausages, sardines, and anchovies) Poultry skins Breaded and/or fried fish or meats Canned peas, beans, and lentils Salted nuts   Fats and Oils   Olive oil and canola oil

## 2024-07-10 NOTE — TELEPHONE ENCOUNTER
[facility] it is [facility] and [facility].00 at [facility]     Please send a new one to [facility]. I will call and cancel the one at [facility].

## 2024-07-10 NOTE — PROGRESS NOTES
Well Adult Note  Name: Luis Miguel Hirsch Today’s Date: 7/10/2024   MRN: [id number] Sex: Male   Age: 29 y.o. Ethnicity: Non- / Non    : 1995 Race: White (non-)      Luis Miguel Hirsch is here for a well adult exam.       Subjective   History:  Here today for his annual physical.  States that he has been doing well since I saw him last year.   Had a girlfriend for a month or so, which he states was a positive experience. Traded cards together.  Reports that he is trying to apply for social security so he can have \"resources to find a job.\" ? Such as clothing, etc.   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   Living at home with his Dad and younger sister.  \"There's a lot of things I know I can do, but struggling to find a job.\"    Has a peer group. Talk frequently over the computer. Don't spend a lot of time together in person.  Has struggled with Depression in the past, but feels like his mood is stable currently. Feels like a lot of it was situational after his Mom passed.  Listens to music when he is feeling down.   Describes himself as a musician; percussionist.     +ADHD. Had been on Vyvanse previously, during his adolescent years. We tried to reorder it last year but insurance wouldn't cover it. However, it is now available generic. We tried Adderall in the meantime (15mg BID). Reports some mild headaches, overstimulation with it. Didn't use it for very long. I last refilled it in .   He is interested in trying Vyvanse again, assuming it is not cost prohibitive.    Weight trending up. 384# today. He was at 362# last year.  States that he noticed that his weight was increasing and trying to become more active. Walking around the mall with a friend, etc.   Thrift shopping. 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    Sleeps about 7-9 hours per night. Quality is OK.  Drinks energy drinks. Bang (zero sugar, 300mg of caffeine). Not every

## 2024-07-11 LAB
25(OH)D3 SERPL-MCNC: 18.5 NG/ML
ALBUMIN SERPL-MCNC: 4.4 G/DL (ref 3.4–5)
ALBUMIN/GLOB SERPL: 1.3 {RATIO} (ref 1.1–2.2)
ALP SERPL-CCNC: 141 U/L (ref 40–129)
ALT SERPL-CCNC: 26 U/L (ref 10–40)
ANION GAP SERPL CALCULATED.3IONS-SCNC: 15 MMOL/L (ref 3–16)
AST SERPL-CCNC: 20 U/L (ref 15–37)
BASOPHILS # BLD: 0.2 K/UL (ref 0–0.2)
BASOPHILS NFR BLD: 1.6 %
BILIRUB SERPL-MCNC: 0.5 MG/DL (ref 0–1)
BUN SERPL-MCNC: 14 MG/DL (ref 7–20)
CALCIUM SERPL-MCNC: 10.1 MG/DL (ref 8.3–10.6)
CHLORIDE SERPL-SCNC: 102 MMOL/L (ref 99–110)
CHOLEST SERPL-MCNC: 197 MG/DL (ref 0–199)
CO2 SERPL-SCNC: 22 MMOL/L (ref 21–32)
CREAT SERPL-MCNC: 0.7 MG/DL (ref 0.9–1.3)
DEPRECATED RDW RBC AUTO: 13.7 % (ref 12.4–15.4)
EOSINOPHIL # BLD: 0.3 K/UL (ref 0–0.6)
EOSINOPHIL NFR BLD: 2.5 %
EST. AVERAGE GLUCOSE BLD GHB EST-MCNC: 99.7 MG/DL
GFR SERPLBLD CREATININE-BSD FMLA CKD-EPI: >90 ML/MIN/{1.73_M2}
GLUCOSE SERPL-MCNC: 83 MG/DL (ref 70–99)
HBA1C MFR BLD: 5.1 %
HCT VFR BLD AUTO: 46.7 % (ref 40.5–52.5)
HDLC SERPL-MCNC: 50 MG/DL (ref 40–60)
HGB BLD-MCNC: 15.4 G/DL (ref 13.5–17.5)
LDLC SERPL CALC-MCNC: 110 MG/DL
LYMPHOCYTES # BLD: 2.6 K/UL (ref 1–5.1)
LYMPHOCYTES NFR BLD: 23.7 %
MCH RBC QN AUTO: 30.3 PG (ref 26–34)
MCHC RBC AUTO-ENTMCNC: 32.9 G/DL (ref 31–36)
MCV RBC AUTO: 92.1 FL (ref 80–100)
MONOCYTES # BLD: 0.6 K/UL (ref 0–1.3)
MONOCYTES NFR BLD: 5.9 %
NEUTROPHILS # BLD: 7.1 K/UL (ref 1.7–7.7)
NEUTROPHILS NFR BLD: 66.3 %
NT-PROBNP SERPL-MCNC: <36 PG/ML (ref 0–124)
PLATELET # BLD AUTO: 362 K/UL (ref 135–450)
PMV BLD AUTO: 9.5 FL (ref 5–10.5)
POTASSIUM SERPL-SCNC: 4.3 MMOL/L (ref 3.5–5.1)
PROT SERPL-MCNC: 7.7 G/DL (ref 6.4–8.2)
RBC # BLD AUTO: 5.07 M/UL (ref 4.2–5.9)
SODIUM SERPL-SCNC: 139 MMOL/L (ref 136–145)
TRIGL SERPL-MCNC: 187 MG/DL (ref 0–150)
TSH SERPL DL<=0.005 MIU/L-ACNC: 1.55 UIU/ML (ref 0.27–4.2)
VLDLC SERPL CALC-MCNC: 37 MG/DL
WBC # BLD AUTO: 10.8 K/UL (ref 4–11)

## 2024-07-11 RX ORDER — LISDEXAMFETAMINE DIMESYLATE 30 MG/1
30 CAPSULE ORAL EVERY MORNING
Qty: 30 CAPSULE | Refills: 0 | Status: SHIPPED | OUTPATIENT
Start: 2024-07-11 | End: 2024-08-10

## 2024-07-11 NOTE — TELEPHONE ENCOUNTER
Called [facility] in [place] and spoke to Gladys. She is going to cancel the Rx for the Vyvanse 30mg that was called in on 7/10/2024.

## 2024-07-26 ENCOUNTER — TELEPHONE (OUTPATIENT)
Dept: PRIMARY CARE CLINIC | Age: 29
End: 2024-07-26

## 2024-07-26 DIAGNOSIS — E55.9 VITAMIN D DEFICIENCY: ICD-10-CM

## 2024-07-26 RX ORDER — ERGOCALCIFEROL 1.25 MG/1
50000 CAPSULE ORAL WEEKLY
Qty: 12 CAPSULE | Refills: 1 | Status: SHIPPED | OUTPATIENT
Start: 2024-07-26

## 2024-07-26 NOTE — TELEPHONE ENCOUNTER
Spoke with his father and he would like the Vitamin D sent to the out patient pharmacy at [facility] please.

## 2024-08-07 ENCOUNTER — OFFICE VISIT (OUTPATIENT)
Dept: PRIMARY CARE CLINIC | Age: 29
End: 2024-08-07
Payer: COMMERCIAL

## 2024-08-07 VITALS
HEART RATE: 87 BPM | TEMPERATURE: 97.5 F | OXYGEN SATURATION: 97 % | SYSTOLIC BLOOD PRESSURE: 116 MMHG | DIASTOLIC BLOOD PRESSURE: 70 MMHG | BODY MASS INDEX: 54.04 KG/M2 | WEIGHT: 315 LBS

## 2024-08-07 DIAGNOSIS — F90.2 ATTENTION DEFICIT HYPERACTIVITY DISORDER, COMBINED TYPE: Primary | ICD-10-CM

## 2024-08-07 PROCEDURE — 99213 OFFICE O/P EST LOW 20 MIN: CPT

## 2024-08-07 RX ORDER — DEXTROAMPHETAMINE SACCHARATE, AMPHETAMINE ASPARTATE MONOHYDRATE, DEXTROAMPHETAMINE SULFATE AND AMPHETAMINE SULFATE 3.75; 3.75; 3.75; 3.75 MG/1; MG/1; MG/1; MG/1
15 CAPSULE, EXTENDED RELEASE ORAL DAILY
Qty: 30 CAPSULE | Refills: 0 | Status: SHIPPED | OUTPATIENT
Start: 2024-08-07 | End: 2024-09-06

## 2024-08-07 ASSESSMENT — ENCOUNTER SYMPTOMS
VOMITING: 0
SHORTNESS OF BREATH: 0
COUGH: 0
NAUSEA: 0

## 2024-08-07 NOTE — PROGRESS NOTES
[place]  2024    Luis Miguel Hirsch (:  1995) is a 29 y.o. male, here for evaluation of the following medical concerns:    Chief Complaint   Patient presents with    1 Month Follow-Up        ASSESSMENT/ PLAN  1. Attention deficit hyperactivity disorder, combined type  - amphetamine-dextroamphetamine (ADDERALL XR) 15 MG extended release capsule; Take 1 capsule by mouth daily for 30 days. Max Daily Amount: 15 mg  Dispense: 30 capsule; Refill: 0     - Will try switching to Adderall XR instead of Vyvanse due to availability. He will communicate with me if unable to get.   - Reinforced important of scheduling his Echo. He is also planning on scheduling with the Weight Loss Management team.    Return in about 3 months (around 2024) for ADHD.    HPI  Here today with his Dad.  States that he hasn't been able to start the Vyvanse because of supply issues with the \"national backorder.\"  Would like to try an alternative.  Tried short-acting Adderall last year and states that it was too stimulating for him.    States that he hasn't been able to get the Echo done yet.  Per Dad, wants to get it done at \"[facility] in [facility] because it is less expensive.  Asking me today about getting a prior authorization for it ?       ROS  Review of Systems   Constitutional:  Negative for chills, fatigue and fever.   Respiratory:  Negative for cough and shortness of breath.    Cardiovascular:  Negative for chest pain.   Gastrointestinal:  Negative for nausea and vomiting.   Genitourinary:  Negative for frequency.   Musculoskeletal:  Negative for myalgias.   Skin: Negative.    Neurological:  Negative for dizziness and weakness.   Psychiatric/Behavioral:  Positive for decreased concentration.        HISTORIES  Current Outpatient Medications on File Prior to Visit   Medication Sig Dispense Refill    vitamin D (ERGOCALCIFEROL) 1.25 MG (61599 UT) CAPS capsule Take 1 capsule by

## 2024-09-06 DIAGNOSIS — F90.2 ATTENTION DEFICIT HYPERACTIVITY DISORDER, COMBINED TYPE: ICD-10-CM

## 2024-09-06 RX ORDER — DEXTROAMPHETAMINE SACCHARATE, AMPHETAMINE ASPARTATE MONOHYDRATE, DEXTROAMPHETAMINE SULFATE AND AMPHETAMINE SULFATE 3.75; 3.75; 3.75; 3.75 MG/1; MG/1; MG/1; MG/1
15 CAPSULE, EXTENDED RELEASE ORAL DAILY
Qty: 30 CAPSULE | Refills: 0 | Status: SHIPPED | OUTPATIENT
Start: 2024-09-06 | End: 2024-10-06

## 2024-11-01 DIAGNOSIS — F90.2 ATTENTION DEFICIT HYPERACTIVITY DISORDER, COMBINED TYPE: ICD-10-CM

## 2024-11-01 RX ORDER — DEXTROAMPHETAMINE SACCHARATE, AMPHETAMINE ASPARTATE MONOHYDRATE, DEXTROAMPHETAMINE SULFATE AND AMPHETAMINE SULFATE 3.75; 3.75; 3.75; 3.75 MG/1; MG/1; MG/1; MG/1
15 CAPSULE, EXTENDED RELEASE ORAL DAILY
Qty: 30 CAPSULE | Refills: 0 | Status: SHIPPED | OUTPATIENT
Start: 2024-11-01 | End: 2024-12-01

## 2024-11-01 NOTE — TELEPHONE ENCOUNTER
Last OV: 08/07/2024 Return in about 3 months (around 11/7/2024) for ADHD.     Next OV: None scheduled.

## 2024-12-02 ENCOUNTER — TELEPHONE (OUTPATIENT)
Dept: PRIMARY CARE CLINIC | Age: 29
End: 2024-12-02

## 2024-12-02 NOTE — TELEPHONE ENCOUNTER
Patient's father called to let us know that he received a call from his HR informing him that Alvino has been \" knocked off his insurance\". Tyrese wanted to know if we have received anything about it. Tyrese is on Alvino's HIPAA form.     I told him no and we usually would not be the ones to receive anything about their insurance. Once I said that he said \"can I record you saying that?!\"      I told him\"No, you cannot record me saying anything!\"    He kept asking me questions about the insurance and I told him that the PCP's office would not be the ones to get any written information about that.     They then made a follow up appointment for Alvino's ADHD medication.

## 2024-12-02 NOTE — TELEPHONE ENCOUNTER
Noted, thanks.    Future Appointments   Date Time Provider Department Center   12/5/2024 11:00 AM Renate Goodrich APRN - CNP [facility] DEP

## 2024-12-05 ENCOUNTER — OFFICE VISIT (OUTPATIENT)
Dept: PRIMARY CARE CLINIC | Age: 29
End: 2024-12-05

## 2024-12-05 VITALS
BODY MASS INDEX: 52.76 KG/M2 | RESPIRATION RATE: 16 BRPM | TEMPERATURE: 97.7 F | OXYGEN SATURATION: 97 % | SYSTOLIC BLOOD PRESSURE: 118 MMHG | DIASTOLIC BLOOD PRESSURE: 70 MMHG | HEART RATE: 87 BPM | WEIGHT: 315 LBS

## 2024-12-05 DIAGNOSIS — Z23 FLU VACCINE NEED: ICD-10-CM

## 2024-12-05 DIAGNOSIS — F90.2 ATTENTION DEFICIT HYPERACTIVITY DISORDER, COMBINED TYPE: Primary | ICD-10-CM

## 2024-12-05 RX ORDER — DEXTROAMPHETAMINE SACCHARATE, AMPHETAMINE ASPARTATE MONOHYDRATE, DEXTROAMPHETAMINE SULFATE AND AMPHETAMINE SULFATE 5; 5; 5; 5 MG/1; MG/1; MG/1; MG/1
20 CAPSULE, EXTENDED RELEASE ORAL DAILY
Qty: 30 CAPSULE | Refills: 0 | Status: SHIPPED | OUTPATIENT
Start: 2024-12-05 | End: 2025-01-04

## 2024-12-05 ASSESSMENT — ENCOUNTER SYMPTOMS
NAUSEA: 0
COUGH: 0
VOMITING: 0
SHORTNESS OF BREATH: 0

## 2024-12-05 NOTE — PROGRESS NOTES
[facility]  2024    Luis Miguel Hirsch (:  1995) is a 29 y.o. male, here for evaluation of the following medical concerns:    Chief Complaint   Patient presents with    ADHD               ASSESSMENT/ PLAN  1. Attention deficit hyperactivity disorder, combined type  - Shared decision making to increase dose from 15mg to 20mg.  - amphetamine-dextroamphetamine (ADDERALL XR) 20 MG extended release capsule; Take 1 capsule by mouth daily for 30 days. Max Daily Amount: 20 mg  Dispense: 30 capsule; Refill: 0    2. Flu vaccine need  - Influenza, FLUCELVAX Trivalent, (age 6 mo+) IM, Preservative Free, 0.5mL       - Med contract signed today.    Controlled substances monitoring: possible medication side effects, risk of tolerance and/or dependence, and alternative treatments discussed, no signs of potential drug abuse or diversion identified, [place]AR[place] report reviewed today- activity consistent with treatment plan, and medication contract signed today.     Return in about 3 months (around 3/5/2025) for ADHD.    HPI  Here today with his Dad. He is here today for his medication check.    +ADHD. We tried to resume the Vyvanse but it was cost prohibitive. We switched to Adderall 15mg XR.  Taking it around 9-10am when he wakes up. Taking on a daily basis.  In the beginning, felt slightly more agitated when he started the medication but OK now.  Dad did note some \"emotional sadness\" in the evenings when the medication wore off. This is better now.  Overall, feels like the medication is helping him focus. Seems more motivated.   Still struggling at times with tasks & executive functioning.  Was in the process of completing paperwork for applying for social security and then didn't follow through.   Dad wanting to hire a social security  to help with the process.   At times, still gets lost in tasks & loses track of time.     Reports an overall decrease in appetite. Weight is down about

## 2025-01-03 DIAGNOSIS — F90.2 ATTENTION DEFICIT HYPERACTIVITY DISORDER, COMBINED TYPE: ICD-10-CM

## 2025-01-03 RX ORDER — DEXTROAMPHETAMINE SACCHARATE, AMPHETAMINE ASPARTATE MONOHYDRATE, DEXTROAMPHETAMINE SULFATE AND AMPHETAMINE SULFATE 5; 5; 5; 5 MG/1; MG/1; MG/1; MG/1
20 CAPSULE, EXTENDED RELEASE ORAL DAILY
Qty: 30 CAPSULE | Refills: 0 | Status: SHIPPED | OUTPATIENT
Start: 2025-01-03 | End: 2025-02-02

## 2025-02-05 DIAGNOSIS — F90.2 ATTENTION DEFICIT HYPERACTIVITY DISORDER, COMBINED TYPE: ICD-10-CM

## 2025-02-05 RX ORDER — DEXTROAMPHETAMINE SACCHARATE, AMPHETAMINE ASPARTATE MONOHYDRATE, DEXTROAMPHETAMINE SULFATE AND AMPHETAMINE SULFATE 5; 5; 5; 5 MG/1; MG/1; MG/1; MG/1
20 CAPSULE, EXTENDED RELEASE ORAL DAILY
Qty: 30 CAPSULE | Refills: 0 | Status: SHIPPED | OUTPATIENT
Start: 2025-02-05 | End: 2025-03-07

## 2025-03-10 ENCOUNTER — OFFICE VISIT (OUTPATIENT)
Dept: FAMILY MEDICINE CLINIC | Age: 30
End: 2025-03-10

## 2025-03-10 VITALS
RESPIRATION RATE: 16 BRPM | TEMPERATURE: 97.8 F | OXYGEN SATURATION: 96 % | DIASTOLIC BLOOD PRESSURE: 84 MMHG | HEART RATE: 118 BPM | SYSTOLIC BLOOD PRESSURE: 125 MMHG | WEIGHT: 315 LBS | BODY MASS INDEX: 52.54 KG/M2

## 2025-03-10 DIAGNOSIS — E55.9 VITAMIN D DEFICIENCY: ICD-10-CM

## 2025-03-10 DIAGNOSIS — F90.2 ATTENTION DEFICIT HYPERACTIVITY DISORDER, COMBINED TYPE: Primary | ICD-10-CM

## 2025-03-10 PROCEDURE — 99213 OFFICE O/P EST LOW 20 MIN: CPT

## 2025-03-10 RX ORDER — ALBUTEROL SULFATE 90 UG/1
INHALANT RESPIRATORY (INHALATION)
COMMUNITY

## 2025-03-10 RX ORDER — DEXTROAMPHETAMINE SACCHARATE, AMPHETAMINE ASPARTATE MONOHYDRATE, DEXTROAMPHETAMINE SULFATE AND AMPHETAMINE SULFATE 5; 5; 5; 5 MG/1; MG/1; MG/1; MG/1
20 CAPSULE, EXTENDED RELEASE ORAL DAILY
Qty: 30 CAPSULE | Refills: 0 | Status: SHIPPED | OUTPATIENT
Start: 2025-03-10 | End: 2025-04-09

## 2025-03-10 RX ORDER — ERGOCALCIFEROL 1.25 MG/1
50000 CAPSULE, LIQUID FILLED ORAL WEEKLY
Qty: 12 CAPSULE | Refills: 1 | Status: SHIPPED | OUTPATIENT
Start: 2025-03-10

## 2025-03-10 SDOH — ECONOMIC STABILITY: FOOD INSECURITY: WITHIN THE PAST 12 MONTHS, YOU WORRIED THAT YOUR FOOD WOULD RUN OUT BEFORE YOU GOT MONEY TO BUY MORE.: NEVER TRUE

## 2025-03-10 SDOH — ECONOMIC STABILITY: FOOD INSECURITY: WITHIN THE PAST 12 MONTHS, THE FOOD YOU BOUGHT JUST DIDN'T LAST AND YOU DIDN'T HAVE MONEY TO GET MORE.: NEVER TRUE

## 2025-03-10 ASSESSMENT — PATIENT HEALTH QUESTIONNAIRE - PHQ9
2. FEELING DOWN, DEPRESSED OR HOPELESS: NOT AT ALL
SUM OF ALL RESPONSES TO PHQ QUESTIONS 1-9: 0
1. LITTLE INTEREST OR PLEASURE IN DOING THINGS: NOT AT ALL
SUM OF ALL RESPONSES TO PHQ QUESTIONS 1-9: 0

## 2025-03-10 ASSESSMENT — ENCOUNTER SYMPTOMS
DIARRHEA: 0
COUGH: 0
ABDOMINAL PAIN: 0
NAUSEA: 0
SHORTNESS OF BREATH: 0
VOMITING: 0

## 2025-03-10 NOTE — PATIENT INSTRUCTIONS
[facility] Financial Resources*  (Call [employer]/211 if need more resources.)      [employer]   Speak to a trained professional 24/7 who can connect you to essential community services including food, clothing, transportation, housing, utilities, employment services, childcare, and baby supplies. 211 serves nationwide.   [facility] [facility] for resources in [place], [facility], [place] and [facility] in [place]; [place], [place], [place], and [facility] in [place].   [email or web address] for resources in [place], [place], [place], [place], [place], [place], [place], [place], [facility], [place], [place], and [facility] in [place].     [employer] Financial Assistance  What they offer: Financial assistance programs that are designed to assist you in finding resources that may help pay your hospital bill. Please click on the links below to learn more about the financial assistance programs available within our regions.  Phone Number: [phone]  How to apply for the [facility] Financial Assistance Program:       Option 1: To apply for financial assistance, a patient (or their family or other provider) should fill out the Financial Assistance Application. Copies of the Financial Assistance Application and the FAP may be obtained for free by calling the [facility] Customer Service department at [phone]   Option 2: The Financial Assistance Application and policy may be obtained for free by downloading a copy from the [employer] website:  https://[email or web address].[email or web address]-resources/financial-assistance  [place] Health Care Assurance Program  What they offer:  Patients who need hospital care, but are unable to pay for it, may be eligible for free or reduced fee care at [facility] through the Hospital Care Assurance Program (HCAP). Applications for HCAP are accepted by the hospital where care was received, and patients seeking HCAP assistance should contact their hospital’s billing department for

## 2025-03-10 NOTE — PROGRESS NOTES
[facility]  3/10/2025    Luis Miguel Hirsch (:  1995) is a 29 y.o. male, here for evaluation of the following medical concerns:    Chief Complaint   Patient presents with    ADHD     Med check        ASSESSMENT/ PLAN  1. Attention deficit hyperactivity disorder, combined type  - Stable.  - amphetamine-dextroamphetamine (ADDERALL XR) 20 MG extended release capsule; Take 1 capsule by mouth daily for 30 days. Max Daily Amount: 20 mg  Dispense: 30 capsule; Refill: 0    2. Vitamin D deficiency  - vitamin D (ERGOCALCIFEROL) 1.25 MG (98417 UT) CAPS capsule; Take 1 capsule by mouth once a week  Dispense: 12 capsule; Refill: 1       Return in about 3 months (around 6/10/2025) for Med check.    Controlled substances monitoring: no signs of potential drug abuse or diversion identified and OARRS report reviewed today- activity consistent with treatment plan.     BP Readings from Last 3 Encounters:   03/10/25 125/84   24 118/70   24 116/70      Wt Readings from Last 3 Encounters:   03/10/25 (!) 168.5 kg (371 lb 6.4 oz)   24 (!) 169.2 kg (373 lb)   24 (!) 173.3 kg (382 lb)        HPI  Patient is here today for his ADHD medication check.  Doing well, overall. Had a cough for a few weeks. Starting to feel better.    +ADHD. He is currently taking Adderall XR 20mg at 10am.   States that he is noticing an improvement. Increase in motivation.  Cleaning his room, the basement, etc.  Started to take up old hobbies that he used to enjoy. Sudoku. Up to Master level.  Spending time with his friend, Luis. [employer] [place] [employer] & [employer] store shopping.  Hasn't put more thought into job prospects.  Slight increase in his appetite. Smaller, more freq meals throughout the day.  Denies chest pain/tightness, etc.    Doesn't drink caffeine after 6pm.  Having some issues falling asleep before 2-3am.   Reading more at night; not playing as many video games.     Will be moving into his Aunt's old

## 2025-04-08 DIAGNOSIS — F90.2 ATTENTION DEFICIT HYPERACTIVITY DISORDER, COMBINED TYPE: ICD-10-CM

## 2025-04-09 RX ORDER — DEXTROAMPHETAMINE SACCHARATE, AMPHETAMINE ASPARTATE MONOHYDRATE, DEXTROAMPHETAMINE SULFATE AND AMPHETAMINE SULFATE 5; 5; 5; 5 MG/1; MG/1; MG/1; MG/1
20 CAPSULE, EXTENDED RELEASE ORAL DAILY
Qty: 30 CAPSULE | Refills: 0 | Status: SHIPPED | OUTPATIENT
Start: 2025-04-09 | End: 2025-05-09

## 2025-04-09 NOTE — TELEPHONE ENCOUNTER
Future Appointments   Date Time Provider Department Center   6/10/2025  1:00 PM Renate Goodrich APRN - CNP [facility] DEP     LOV 3/10/2025

## 2025-05-10 DIAGNOSIS — F90.2 ATTENTION DEFICIT HYPERACTIVITY DISORDER, COMBINED TYPE: ICD-10-CM

## 2025-05-12 RX ORDER — DEXTROAMPHETAMINE SACCHARATE, AMPHETAMINE ASPARTATE MONOHYDRATE, DEXTROAMPHETAMINE SULFATE AND AMPHETAMINE SULFATE 5; 5; 5; 5 MG/1; MG/1; MG/1; MG/1
20 CAPSULE, EXTENDED RELEASE ORAL DAILY
Qty: 30 CAPSULE | Refills: 0 | Status: SHIPPED | OUTPATIENT
Start: 2025-05-12 | End: 2025-06-11

## 2025-05-12 NOTE — TELEPHONE ENCOUNTER
Future Appointments   Date Time Provider Department Center   6/10/2025  1:00 PM Renate Goodrich APRN - CNP [facility] DEP     LOV 3/10/2025

## 2025-06-10 ENCOUNTER — OFFICE VISIT (OUTPATIENT)
Dept: FAMILY MEDICINE CLINIC | Age: 30
End: 2025-06-10

## 2025-06-10 VITALS
OXYGEN SATURATION: 98 % | SYSTOLIC BLOOD PRESSURE: 120 MMHG | TEMPERATURE: 97.1 F | HEART RATE: 91 BPM | DIASTOLIC BLOOD PRESSURE: 80 MMHG | RESPIRATION RATE: 16 BRPM | BODY MASS INDEX: 53.27 KG/M2 | WEIGHT: 315 LBS

## 2025-06-10 DIAGNOSIS — F90.2 ATTENTION DEFICIT HYPERACTIVITY DISORDER, COMBINED TYPE: Primary | ICD-10-CM

## 2025-06-10 PROCEDURE — 99213 OFFICE O/P EST LOW 20 MIN: CPT

## 2025-06-10 RX ORDER — DEXTROAMPHETAMINE SACCHARATE, AMPHETAMINE ASPARTATE MONOHYDRATE, DEXTROAMPHETAMINE SULFATE AND AMPHETAMINE SULFATE 7.5; 7.5; 7.5; 7.5 MG/1; MG/1; MG/1; MG/1
30 CAPSULE, EXTENDED RELEASE ORAL DAILY
Qty: 30 CAPSULE | Refills: 0 | Status: SHIPPED | OUTPATIENT
Start: 2025-08-09 | End: 2025-09-08

## 2025-06-10 RX ORDER — DEXTROAMPHETAMINE SACCHARATE, AMPHETAMINE ASPARTATE MONOHYDRATE, DEXTROAMPHETAMINE SULFATE AND AMPHETAMINE SULFATE 7.5; 7.5; 7.5; 7.5 MG/1; MG/1; MG/1; MG/1
30 CAPSULE, EXTENDED RELEASE ORAL DAILY
Qty: 30 CAPSULE | Refills: 0 | Status: SHIPPED | OUTPATIENT
Start: 2025-06-10 | End: 2025-07-10

## 2025-06-10 RX ORDER — DEXTROAMPHETAMINE SACCHARATE, AMPHETAMINE ASPARTATE MONOHYDRATE, DEXTROAMPHETAMINE SULFATE AND AMPHETAMINE SULFATE 7.5; 7.5; 7.5; 7.5 MG/1; MG/1; MG/1; MG/1
30 CAPSULE, EXTENDED RELEASE ORAL DAILY
Qty: 30 CAPSULE | Refills: 0 | Status: SHIPPED | OUTPATIENT
Start: 2025-07-10 | End: 2025-08-09

## 2025-06-10 ASSESSMENT — ENCOUNTER SYMPTOMS
SHORTNESS OF BREATH: 0
COUGH: 0
NAUSEA: 0
VOMITING: 0

## 2025-06-10 ASSESSMENT — PATIENT HEALTH QUESTIONNAIRE - PHQ9
SUM OF ALL RESPONSES TO PHQ QUESTIONS 1-9: 0
1. LITTLE INTEREST OR PLEASURE IN DOING THINGS: NOT AT ALL
SUM OF ALL RESPONSES TO PHQ QUESTIONS 1-9: 0
2. FEELING DOWN, DEPRESSED OR HOPELESS: NOT AT ALL

## 2025-06-10 NOTE — PROGRESS NOTES
alert.   Psychiatric:         Attention and Perception: Attention normal.         Mood and Affect: Mood normal. Affect is flat.         Speech: Speech normal.         Behavior: Behavior normal. Behavior is cooperative.         Thought Content: Thought content normal.         Cognition and Memory: Cognition and memory normal.         Judgment: Judgment normal.      Comments: Per baseline         Renate Goodrich, DANNY - CNP

## 2025-08-18 DIAGNOSIS — E55.9 VITAMIN D DEFICIENCY: ICD-10-CM

## 2025-08-18 RX ORDER — ERGOCALCIFEROL 1.25 MG/1
50000 CAPSULE, LIQUID FILLED ORAL WEEKLY
Qty: 12 CAPSULE | Refills: 1 | Status: SHIPPED | OUTPATIENT
Start: 2025-08-18